# Patient Record
Sex: MALE | Race: WHITE | NOT HISPANIC OR LATINO | Employment: UNEMPLOYED | URBAN - NONMETROPOLITAN AREA
[De-identification: names, ages, dates, MRNs, and addresses within clinical notes are randomized per-mention and may not be internally consistent; named-entity substitution may affect disease eponyms.]

---

## 2017-04-10 ENCOUNTER — HOSPITAL ENCOUNTER (EMERGENCY)
Facility: HOSPITAL | Age: 47
Discharge: HOME | End: 2017-04-10
Attending: EMERGENCY MEDICINE | Admitting: EMERGENCY MEDICINE

## 2017-04-10 ENCOUNTER — APPOINTMENT (OUTPATIENT)
Dept: GENERAL RADIOLOGY | Facility: HOSPITAL | Age: 47
End: 2017-04-10

## 2017-04-10 VITALS
DIASTOLIC BLOOD PRESSURE: 62 MMHG | HEART RATE: 63 BPM | BODY MASS INDEX: 25.2 KG/M2 | HEIGHT: 71 IN | RESPIRATION RATE: 16 BRPM | OXYGEN SATURATION: 100 % | SYSTOLIC BLOOD PRESSURE: 118 MMHG | TEMPERATURE: 98.3 F | WEIGHT: 180 LBS

## 2017-04-10 DIAGNOSIS — M54.42 ACUTE LEFT-SIDED LOW BACK PAIN WITH LEFT-SIDED SCIATICA: Primary | ICD-10-CM

## 2017-04-10 PROCEDURE — 72110 X-RAY EXAM L-2 SPINE 4/>VWS: CPT

## 2017-04-10 PROCEDURE — 99283 EMERGENCY DEPT VISIT LOW MDM: CPT

## 2017-04-10 RX ORDER — KETOROLAC TROMETHAMINE 30 MG/ML
60 INJECTION, SOLUTION INTRAMUSCULAR; INTRAVENOUS ONCE
Status: DISCONTINUED | OUTPATIENT
Start: 2017-04-10 | End: 2017-04-10 | Stop reason: HOSPADM

## 2017-04-10 RX ORDER — METHYLPREDNISOLONE 4 MG/1
TABLET ORAL
Qty: 21 TABLET | Refills: 0 | OUTPATIENT
Start: 2017-04-10 | End: 2020-01-15 | Stop reason: HOSPADM

## 2017-04-10 NOTE — ED PROVIDER NOTES
Subjective   Patient is a 47 y.o. male presenting with back pain.   Back Pain   Location:  Lumbar spine  Quality:  Shooting  Radiates to:  L thigh  Pain severity:  Mild  Pain is:  Same all the time  Onset quality:  Sudden  Timing:  Constant  Progression:  Unchanged  Chronicity:  New  Context: lifting heavy objects    Context: not emotional stress, not falling, not jumping from heights, not MCA, not MVA, not occupational injury, not pedestrian accident, not physical stress, not recent illness, not recent injury and not twisting    Relieved by:  Nothing  Worsened by:  Movement and bending  Ineffective treatments:  None tried  Associated symptoms: no abdominal pain, no abdominal swelling, no bladder incontinence, no bowel incontinence, no chest pain, no dysuria, no fever, no headaches, no leg pain, no numbness, no paresthesias, no pelvic pain, no perianal numbness, no tingling, no weakness and no weight loss    Risk factors: no hx of cancer, no hx of osteoporosis, no lack of exercise, no menopause, not obese, no recent surgery and no vascular disease        Review of Systems   Constitutional: Negative.  Negative for chills, fatigue, fever and weight loss.   HENT: Negative.  Negative for congestion.    Respiratory: Negative.  Negative for cough, chest tightness and stridor.    Cardiovascular: Negative.  Negative for chest pain.   Gastrointestinal: Negative.  Negative for abdominal distention, abdominal pain, bowel incontinence, nausea and vomiting.   Endocrine: Negative.    Genitourinary: Negative.  Negative for bladder incontinence, difficulty urinating, dysuria, flank pain and pelvic pain.   Musculoskeletal: Positive for back pain.   Skin: Negative.  Negative for color change.   Neurological: Negative.  Negative for dizziness, tingling, weakness, numbness, headaches and paresthesias.   All other systems reviewed and are negative.      Past Medical History:   Diagnosis Date   • Gallbladder attack        No Known  Allergies    History reviewed. No pertinent surgical history.    History reviewed. No pertinent family history.    Social History     Social History   • Marital status: Single     Spouse name: N/A   • Number of children: N/A   • Years of education: N/A     Social History Main Topics   • Smoking status: Current Every Day Smoker     Packs/day: 1.00     Types: Cigarettes   • Smokeless tobacco: None   • Alcohol use No   • Drug use: No   • Sexual activity: Defer     Other Topics Concern   • None     Social History Narrative           Objective   Physical Exam   Constitutional: He is oriented to person, place, and time. He appears well-developed and well-nourished.   HENT:   Head: Normocephalic and atraumatic.   Eyes: Conjunctivae and EOM are normal. Pupils are equal, round, and reactive to light.   Neck: Normal range of motion. Neck supple.   Cardiovascular: Normal rate, regular rhythm, normal heart sounds and intact distal pulses.    Pulmonary/Chest: Effort normal and breath sounds normal.   Abdominal: Soft. Bowel sounds are normal.   Musculoskeletal:        Lumbar back: He exhibits decreased range of motion and tenderness. He exhibits no bony tenderness, no swelling, no edema, no deformity, no laceration, no pain, no spasm and normal pulse.   Neurological: He is alert and oriented to person, place, and time. He has normal reflexes. No cranial nerve deficit or sensory deficit. He displays no Babinski's sign on the right side. He displays no Babinski's sign on the left side.   Reflex Scores:       Tricep reflexes are 2+ on the right side and 2+ on the left side.       Bicep reflexes are 2+ on the right side and 2+ on the left side.       Brachioradialis reflexes are 2+ on the right side and 2+ on the left side.       Patellar reflexes are 2+ on the right side and 2+ on the left side.       Achilles reflexes are 2+ on the right side and 2+ on the left side.  Skin: Skin is warm and dry.   Psychiatric: He has a normal mood  and affect.   Nursing note and vitals reviewed.      Procedures         ED Course  ED Course   Comment By Time   Will need an mri as out pt Denny Barrera MD 04/10 0814                  [facility]    Final diagnoses:   Acute left-sided low back pain with left-sided sciatica            Denny Barrera MD  04/10/17 0823

## 2017-04-25 PROCEDURE — 82150 ASSAY OF AMYLASE: CPT | Performed by: NURSE PRACTITIONER

## 2017-04-25 PROCEDURE — 85025 COMPLETE CBC W/AUTO DIFF WBC: CPT | Performed by: NURSE PRACTITIONER

## 2017-04-25 PROCEDURE — 80053 COMPREHEN METABOLIC PANEL: CPT | Performed by: NURSE PRACTITIONER

## 2017-04-25 PROCEDURE — 83690 ASSAY OF LIPASE: CPT | Performed by: NURSE PRACTITIONER

## 2020-01-15 ENCOUNTER — HOSPITAL ENCOUNTER (EMERGENCY)
Facility: HOSPITAL | Age: 50
Discharge: HOME | End: 2020-01-15
Admitting: EMERGENCY MEDICINE

## 2020-01-15 VITALS
WEIGHT: 180 LBS | HEART RATE: 83 BPM | RESPIRATION RATE: 18 BRPM | SYSTOLIC BLOOD PRESSURE: 145 MMHG | HEIGHT: 71 IN | OXYGEN SATURATION: 97 % | DIASTOLIC BLOOD PRESSURE: 80 MMHG | TEMPERATURE: 98.9 F | BODY MASS INDEX: 25.2 KG/M2

## 2020-01-15 DIAGNOSIS — T23.232A PARTIAL THICKNESS BURN OF MULTIPLE FINGERS OF LEFT HAND EXCLUDING THUMB, INITIAL ENCOUNTER: ICD-10-CM

## 2020-01-15 DIAGNOSIS — T23.231A PARTIAL THICKNESS BURN OF MULTIPLE FINGERS OF RIGHT HAND EXCLUDING THUMB, INITIAL ENCOUNTER: Primary | ICD-10-CM

## 2020-01-15 DIAGNOSIS — T23.199A: ICD-10-CM

## 2020-01-15 PROCEDURE — 25010000002 MORPHINE PER 10 MG: Performed by: EMERGENCY MEDICINE

## 2020-01-15 PROCEDURE — 99284 EMERGENCY DEPT VISIT MOD MDM: CPT

## 2020-01-15 PROCEDURE — 96372 THER/PROPH/DIAG INJ SC/IM: CPT

## 2020-01-15 RX ORDER — HYDROCODONE BITARTRATE AND ACETAMINOPHEN 5; 325 MG/1; MG/1
1 TABLET ORAL EVERY 4 HOURS PRN
Qty: 15 TABLET | Refills: 0 | OUTPATIENT
Start: 2020-01-15 | End: 2020-03-30 | Stop reason: HOSPADM

## 2020-01-15 RX ORDER — ONDANSETRON 4 MG/1
4 TABLET, ORALLY DISINTEGRATING ORAL ONCE
Status: COMPLETED | OUTPATIENT
Start: 2020-01-15 | End: 2020-01-15

## 2020-01-15 RX ADMIN — ONDANSETRON 4 MG: 4 TABLET, ORALLY DISINTEGRATING ORAL at 11:44

## 2020-01-15 RX ADMIN — MORPHINE SULFATE 4 MG: 4 INJECTION, SOLUTION INTRAMUSCULAR; INTRAVENOUS at 11:44

## 2020-01-15 NOTE — DISCHARGE INSTRUCTIONS
Burn Care, Adult  A burn is an injury to the skin or the tissues under the skin. There are three types of burns:  · First degree. These burns may cause the skin to be red and a bit swollen.  · Second degree. These burns are very painful and cause the skin to be very red. The skin may also leak fluid, look shiny, and start to have blisters.  · Third degree. These burns cause permanent damage. They turn the skin white or black and make it look charred, dry, and leathery.  Taking care of your burn properly can help to prevent pain and infection. It can also help the burn to heal more quickly.  How is this treated?  Right after a burn:  · Rinse or soak the burn under cool water. Do this for several minutes. Do not put ice on your burn. That can cause more damage.  · Lightly cover the burn with a clean (sterile) cloth (dressing).  Burn care  · Raise (elevate) the injured area above the level of your heart while sitting or lying down.  · Follow instructions from your doctor about:  ? How to clean and take care of the burn.  ? When to change and remove the cloth.  · Check your burn every day for signs of infection. Check for:  ? More redness, swelling, or pain.  ? Warmth.  ? Pus or a bad smell.  Medicine    · Take over-the-counter and prescription medicines only as told by your doctor.  · If you were prescribed antibiotic medicine, take or apply it as told by your doctor. Do not stop using the antibiotic even if your condition improves.  General instructions  · To prevent infection:  ? Do not put butter, oil, or other home treatments on the burn.  ? Do not scratch or pick at the burn.  ? Do not break any blisters.  ? Do not peel skin.  · Do not rub your burn, even when you are cleaning it.  · Protect your burn from the sun.  Contact a doctor if:  · Your condition does not get better.  · Your condition gets worse.  · You have a fever.  · Your burn looks different or starts to have black or red spots on it.  · Your burn  feels warm to the touch.  · Your pain is not controlled with medicine.  Get help right away if:  · You have redness, swelling, or pain at the site of the burn.  · You have fluid, blood, or pus coming from your burn.  · You have red streaks near the burn.  · You have very bad pain.  This information is not intended to replace advice given to you by your health care provider. Make sure you discuss any questions you have with your health care provider.  Document Released: 09/26/2009 Document Revised: 07/31/2018 Document Reviewed: 06/06/2017  [employer] Interactive Patient Education © 2019 [employer]      Second-Degree Burn, Adult  A second-degree burn, also called a partial thickness wound, is a serious injury that affects the first two layers of skin. A second-degree burn may be minor or major, depending on the size of the burn and which parts of the skin are burned.  What are the causes?  This condition may be caused by:  · Heat. Burns caused by heat happen when skin comes in contact with something very hot, such as a flame or hot liquid.  · Radiation. Sources of radiation include sunlight, radiation used to heat food, and radiation treatments.  · Electricity. Burns caused by electricity happen when electricity passes through the body from lightning, wiring, electrical outlets, appliances, or power lines.  · Certain chemicals, such as acids that come in contact with the skin or eyes. Some chemicals can go through clothing.  What increases the risk?  This condition is more likely to occur in people who:  · Are often exposed to high-risk environments, such as those with open flames, chemicals, or electricity.  · Have cancer and are being treated with radiation.  What are the signs or symptoms?  Symptoms of this condition include:  · Severe pain.  · Skin that is deep red, blistered, tender, and swollen.  · Skin that has changed color.  · Skin that looks blotchy, wet, or shiny.  How is this diagnosed?  This condition is  usually diagnosed with an exam of the wounded area. To get a better look at the wound, your health care provider may remove any blistered skin.  It may take several days to find out if you have a second-degree burn because the signs of this kind of burn can take time to develop. You may need to watch the wound for changes at home and visit a health care provider repeatedly to have your wound checked for changes. If the wound is large, you may need to stay in the hospital so a health care team can examine the wound for a few days.  How is this treated?  Treatment depends on the severity and cause of the burn. Healing may take several weeks. Some second-degree burns, including major burns, electrical burns, and chemical burns, may need to be treated in a hospital. Treatment may involve:  · Cooling the burn with cool, germ-free (sterile) water.  · Taking or applying medicines, such as:  ? Medicines to relieve pain or itching.  ? Ointments to treat or prevent infection.  ? Antibiotic medicine to treat or prevent infection.  · Getting a tetanus shot.  · Covering the burn with a bandage (dressing). If your fingers or toes were burned, each of them may be bandaged separately.  · Compression dressings to prevent scarring and help the burned body part stay moveable.  · Removing dead skin. This is done by a health care provider. Do not try to remove dead skin yourself.  Deep burns can cause skin tissue to die, and a scab (eschar) may form where the skin used to be. If you have a deep burn and an eschar forms, you may need surgery to remove the eschar so the skin can heal properly. If your wound is deep and large (it covers more than 15% of your skin), treatment may also involve:  · Receiving fluids and nutrition.  · Close monitoring of blood flow near the wound.  · Oxygen given through a mask or a machine (ventilator). This may be needed if a burn causes fluid shifts in the body that make it hard to breathe.  Follow these  instructions at home:  Wound care    · Follow instructions from your health care provider about how to take care of your wound. Make sure you:  ? Wash your hands with soap and water before you change your dressing. If soap and water are not available, use hand .  ? Change your dressing as directed.  ? If you have a compression dressing, wear it as directed.  · Clean your wound 2-3 times a day or as often as directed.  ? Wash the wound with mild soap and water.  ? Rinse the wound with water to remove all soap.  ? Pat the wound dry with a clean towel. Do not rub it.  · Check your wound every day for signs of infection. Check for:  ? More redness, swelling, or pain.  ? More fluid or blood.  ? Warmth.  ? Pus or a bad smell.  ? Yellow or green fluid.  · Do not scratch or pick at the wound  · Do not break any blisters or peel any skin.  · Avoid exposing your wound to the sun.  Medicine  · Take and apply over-the-counter and prescription medicines only as told by your health care provider.  · Take or apply your antibiotic medicine as told by your health care provider. Do not stop using the antibiotic even if you start to feel better.  Eating and drinking  · Drink enough fluid to keep your urine clear or pale yellow.  · Eat a nutritious diet that is high in protein. This will help your wound to heal.  General instructions  · Raise (elevate) the injured area above the level of your heart while sitting or lying down.  · Rest as directed. Do not exercise until your health care provider approves.  · Do not take baths, swim, or use a hot tub until your health care provider approves.  · Do not put ice on your burn. This can cause more damage. Try cooling the burn with:  ? Cool water.  ? A cool, wet, clean cloth (cool compress).  · Keep all follow-up visits as directed. This is important.  Contact a health care provider if:  · Your symptoms do not improve with treatment.  · Your pain is not relieved with medicine.  · You  have more redness, swelling, or pain around your wound.  · You have more fluid or blood coming from your wound.  · You have yellow or green fluid, pus, or a bad smell coming from your wound.  · Your wound feels warm to the touch.  · You have a fever.  Get help right away if:  · You develop red streaks near the wound.  · You develop severe pain.  Summary  · A second-degree burn is a serious injury that affects the first two layers of skin.  · Clean your wound 2-3 times a day or as often as directed. Check your wound every day for signs of infection.  · Do not scratch or pick at your wound, break blisters, peel skin, or put ice on your burn.  This information is not intended to replace advice given to you by your health care provider. Make sure you discuss any questions you have with your health care provider.  Document Released: 05/21/2012 Document Revised: 11/28/2017 Document Reviewed: 11/28/2017  [employer] Interactive Patient Education © 2019 [employer]
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
## 2020-03-30 ENCOUNTER — APPOINTMENT (OUTPATIENT)
Dept: GENERAL RADIOLOGY | Facility: HOSPITAL | Age: 50
End: 2020-03-30

## 2020-03-30 ENCOUNTER — HOSPITAL ENCOUNTER (EMERGENCY)
Facility: HOSPITAL | Age: 50
Discharge: HOME | End: 2020-03-30
Admitting: EMERGENCY MEDICINE

## 2020-03-30 VITALS
OXYGEN SATURATION: 100 % | WEIGHT: 158 LBS | DIASTOLIC BLOOD PRESSURE: 90 MMHG | RESPIRATION RATE: 16 BRPM | BODY MASS INDEX: 22.12 KG/M2 | HEIGHT: 71 IN | SYSTOLIC BLOOD PRESSURE: 136 MMHG | TEMPERATURE: 98 F | HEART RATE: 61 BPM

## 2020-03-30 DIAGNOSIS — S86.911A KNEE STRAIN, RIGHT, INITIAL ENCOUNTER: Primary | ICD-10-CM

## 2020-03-30 DIAGNOSIS — M17.11 ARTHRITIS OF RIGHT KNEE: ICD-10-CM

## 2020-03-30 PROCEDURE — 73562 X-RAY EXAM OF KNEE 3: CPT

## 2020-03-30 PROCEDURE — 99283 EMERGENCY DEPT VISIT LOW MDM: CPT

## 2020-03-30 RX ORDER — RANITIDINE HCL 75 MG
75 TABLET ORAL 2 TIMES DAILY PRN
COMMUNITY
End: 2020-06-22

## 2020-03-30 RX ORDER — OXYCODONE HYDROCHLORIDE AND ACETAMINOPHEN 5; 325 MG/1; MG/1
1 TABLET ORAL EVERY 4 HOURS PRN
Qty: 12 TABLET | Refills: 0 | Status: SHIPPED | OUTPATIENT
Start: 2020-03-30 | End: 2020-06-22

## 2020-03-30 RX ORDER — METHYLPREDNISOLONE 4 MG/1
TABLET ORAL
Qty: 21 TABLET | Refills: 0 | Status: SHIPPED | OUTPATIENT
Start: 2020-03-30 | End: 2020-06-22

## 2020-06-22 ENCOUNTER — APPOINTMENT (OUTPATIENT)
Dept: PREADMISSION TESTING | Facility: HOSPITAL | Age: 50
End: 2020-06-22

## 2020-06-22 VITALS
SYSTOLIC BLOOD PRESSURE: 125 MMHG | BODY MASS INDEX: 24.93 KG/M2 | WEIGHT: 174.16 LBS | HEIGHT: 70 IN | DIASTOLIC BLOOD PRESSURE: 64 MMHG | OXYGEN SATURATION: 97 % | HEART RATE: 57 BPM | RESPIRATION RATE: 18 BRPM

## 2020-06-22 LAB
DEPRECATED RDW RBC AUTO: 45.4 FL (ref 37–54)
ERYTHROCYTE [DISTWIDTH] IN BLOOD BY AUTOMATED COUNT: 14 % (ref 12.3–15.4)
HCT VFR BLD AUTO: 42.4 % (ref 37.5–51)
HGB BLD-MCNC: 13.5 G/DL (ref 13–17.7)
MCH RBC QN AUTO: 28.4 PG (ref 26.6–33)
MCHC RBC AUTO-ENTMCNC: 31.8 G/DL (ref 31.5–35.7)
MCV RBC AUTO: 89.1 FL (ref 79–97)
PLATELET # BLD AUTO: 301 10*3/MM3 (ref 140–450)
PMV BLD AUTO: 9.7 FL (ref 6–12)
RBC # BLD AUTO: 4.76 10*6/MM3 (ref 4.14–5.8)
WBC NRBC COR # BLD: 10.14 10*3/MM3 (ref 3.4–10.8)

## 2020-06-22 PROCEDURE — 36415 COLL VENOUS BLD VENIPUNCTURE: CPT

## 2020-06-22 PROCEDURE — 85027 COMPLETE CBC AUTOMATED: CPT | Performed by: ORTHOPAEDIC SURGERY

## 2020-06-22 NOTE — DISCHARGE INSTRUCTIONS
DAY OF SURGERY INSTRUCTIONS        YOUR SURGEON: RAGHU CANNON    PROCEDURE: RIGHT KNEE ARTHROSCOPY    DATE OF SURGERY: July 1, 2020    ARRIVAL TIME: AS DIRECTED BY OFFICE    YOU MAY TAKE THE FOLLOWING MEDICATION(S) THE MORNING OF SURGERY WITH A SIP OF WATER: NONE      ALL OTHER HOME MEDICATION CHECK WITH YOUR PHYSICIAN      DO NOT TAKE ANY ERECTILE DYSFUNCTION MEDICATIONS (EX: CIALIS, VIAGRA) 24 HOURS PRIOR TO SURGERY                      MANAGING PAIN AFTER SURGERY    We know you are probably wondering what your pain will be like after surgery.  Following surgery it is unrealistic to expect you will not have pain.   Pain is how our bodies let us know that something is wrong or cautions us to be careful.  That said, our goal is to make your pain tolerable.    Methods we may use to treat your pain include (oral or IV medications, PCAs, epidurals, nerve blocks, etc.)   While some procedures require IV pain medications for a short time after surgery, transitioning to pain medications by mouth allows for better management of pain.   Your nurse will encourage you to take oral pain medications whenever possible.  IV medications work almost immediately, but only last a short while.  Taking medications by mouth allows for a more constant level of medication in your blood stream for a longer period of time.      Once your pain is out of control it is harder to get back under control.  It is important you are aware when your next dose of pain medication is due.  If you are admitted, your nurse may write the time of your next dose on the white board in your room to help you remember.      We are interested in your pain and encourage you to inform us about aggravating factors during your visit.   Many times a simple repositioning every few hours can make a big difference.    If your physician says it is okay, do not let your pain prevent you from getting out of bed. Be sure to call your nurse for assistance prior to  getting up so you do not fall.      Before surgery, please decide your tolerable pain goal.  These faces help describe the pain ratings we use on a 0-10 scale.   Be prepared to tell us your goal and whether or not you take pain or anxiety medications at home.          BEFORE YOU COME TO THE HOSPITAL  (Pre-op instructions)  • Do not eat, drink, smoke or chew gum after midnight the night before surgery.  This also includes no mints.  • Morning of surgery take only the medicines you have been instructed with a sip of water unless otherwise instructed  by your physician.  • Do not shave, wear makeup or dark nail polish.  • Remove all jewelry including rings.  • Leave anything you consider valuable at home.  • Leave your suitcase in the car until after your surgery.  • Bring the following with you if applicable:  o Picture ID and insurance, Medicare or Medicaid cards  o Co-pay/deductible required by insurance (cash, check, credit card)  o Copy of advance directive, living will or power-of- documents if not brought to PAT  o CPAP or BIPAP mask and tubing  o Relaxation aids ( book, magazine), etc.  o Hearing aids                        ON THE DAY OF SURGERY  · On the day of surgery check in at registration located at the main entrance of the hospital.   ? You will be registered and given a beeper with instructions where to wait in the main lobby.  ? When your beeper lights up and vibrates a member of the Outpatient Surgery staff will meet you at the double doors under the stair steps and escort you to your preoperative room.   · You may have cloth compression devices placed on your legs. These help to prevent blood clots and reduce swelling in your legs.  · An IV may be inserted into one of your veins.  · In the operating room, you may be given one or more of the following:  ? A medicine to help you relax (sedative).  ? A medicine to numb the area (local anesthetic).  ? A medicine to make you fall asleep (general  "anesthetic).  ? A medicine that is injected into an area of your body to numb everything below the injection site (regional anesthetic).  · Your surgical site will be marked or identified.  · You may be given an antibiotic through your IV to help prevent infection.  Contact a health care provider if you:  · Develop a fever of more than 100.4°F (38°C) or other feelings of illness during the 48 hours before your surgery.  · Have symptoms that get worse.  Have questions or concerns about your surgery    General Anesthesia/Surgery, Adult  General anesthesia is the use of medicines to make a person \"go to sleep\" (unconscious) for a medical procedure. General anesthesia must be used for certain procedures, and is often recommended for procedures that:  · Last a long time.  · Require you to be still or in an unusual position.  · Are major and can cause blood loss.  The medicines used for general anesthesia are called general anesthetics. As well as making you unconscious for a certain amount of time, these medicines:  · Prevent pain.  · Control your blood pressure.  · Relax your muscles.  Tell a health care provider about:  · Any allergies you have.  · All medicines you are taking, including vitamins, herbs, eye drops, creams, and over-the-counter medicines.  · Any problems you or family members have had with anesthetic medicines.  · Types of anesthetics you have had in the past.  · Any blood disorders you have.  · Any surgeries you have had.  · Any medical conditions you have.  · Any recent upper respiratory, chest, or ear infections.  · Any history of:  ? Heart or lung conditions, such as heart failure, sleep apnea, asthma, or chronic obstructive pulmonary disease (COPD).  ?  service.  ? Depression or anxiety.  · Any tobacco or drug use, including marijuana or alcohol use.  · Whether you are pregnant or may be pregnant.  What are the risks?  Generally, this is a safe procedure. However, problems may occur, " including:  · Allergic reaction.  · Lung and heart problems.  · Inhaling food or liquid from the stomach into the lungs (aspiration).  · Nerve injury.  · Air in the bloodstream, which can lead to stroke.  · Extreme agitation or confusion (delirium) when you wake up from the anesthetic.  · Waking up during your procedure and being unable to move. This is rare.  These problems are more likely to develop if you are having a major surgery or if you have an advanced or serious medical condition. You can prevent some of these complications by answering all of your health care provider's questions thoroughly and by following all instructions before your procedure.  General anesthesia can cause side effects, including:  · Nausea or vomiting.  · A sore throat from the breathing tube.  · Hoarseness.  · Wheezing or coughing.  · Shaking chills.  · Tiredness.  · Body aches.  · Anxiety.  · Sleepiness or drowsiness.  · Confusion or agitation.  RISKS AND COMPLICATIONS OF SURGERY  Your health care provider will discuss possible risks and complications with you before surgery. Common risks and complications include:    · Problems due to the use of anesthetics.  · Blood loss and replacement (does not apply to minor surgical procedures).  · Temporary increase in pain due to surgery.  · Uncorrected pain or problems that the surgery was meant to correct.  · Infection.  · New damage.    What happens before the procedure?    Medicines  Ask your health care provider about:  · Changing or stopping your regular medicines. This is especially important if you are taking diabetes medicines or blood thinners.  · Taking medicines such as aspirin and ibuprofen. These medicines can thin your blood. Do not take these medicines unless your health care provider tells you to take them.  · Taking over-the-counter medicines, vitamins, herbs, and supplements. Do not take these during the week before your procedure unless your health care provider approves  them.  General instructions  · Starting 3-6 weeks before the procedure, do not use any products that contain nicotine or tobacco, such as cigarettes and e-cigarettes. If you need help quitting, ask your health care provider.  · If you brush your teeth on the morning of the procedure, make sure to spit out all of the toothpaste.  · Tell your health care provider if you become ill or develop a cold, cough, or fever.  · If instructed by your health care provider, bring your sleep apnea device with you on the day of your surgery (if applicable).  · Ask your health care provider if you will be going home the same day, the following day, or after a longer hospital stay.  ? Plan to have someone take you home from the hospital or clinic.  ? Plan to have a responsible adult care for you for at least 24 hours after you leave the hospital or clinic. This is important.  What happens during the procedure?  · You will be given anesthetics through both of the following:  ? A mask placed over your nose and mouth.  ? An IV in one of your veins.  · You may receive a medicine to help you relax (sedative).  · After you are unconscious, a breathing tube may be inserted down your throat to help you breathe. This will be removed before you wake up.  · An anesthesia specialist will stay with you throughout your procedure. He or she will:  ? Keep you comfortable and safe by continuing to give you medicines and adjusting the amount of medicine that you get.  ? Monitor your blood pressure, pulse, and oxygen levels to make sure that the anesthetics do not cause any problems.  The procedure may vary among health care providers and hospitals.  What happens after the procedure?  · Your blood pressure, temperature, heart rate, breathing rate, and blood oxygen level will be monitored until the medicines you were given have worn off.  · You will wake up in a recovery area. You may wake up slowly.  · If you feel anxious or agitated, you may be given  medicine to help you calm down.  · If you will be going home the same day, your health care provider may check to make sure you can walk, drink, and urinate.  · Your health care provider will treat any pain or side effects you have before you go home.  · Do not drive for 24 hours if you were given a sedative.  Summary  · General anesthesia is used to keep you still and prevent pain during a procedure.  · It is important to tell your healthcare provider about your medical history and any surgeries you have had, and previous experience with anesthesia.  · Follow your healthcare provider’s instructions about when to stop eating, drinking, or taking certain medicines before your procedure.  · Plan to have someone take you home from the hospital or clinic.  This information is not intended to replace advice given to you by your health care provider. Make sure you discuss any questions you have with your health care provider.  Document Released: 03/26/2009 Document Revised: 08/03/2018 Document Reviewed: 08/03/2018  [employer] Interactive Patient Education © 2019 [employer]       Fall Prevention in Hospitals, Adult  As a hospital patient, your condition and the treatments you receive can increase your risk for falls. Some additional risk factors for falls in a hospital include:  · Being in an unfamiliar environment.  · Being on bed rest.  · Your surgery.  · Taking certain medicines.  · Your tubing requirements, such as intravenous (IV) therapy or catheters.  It is important that you learn how to decrease fall risks while at the hospital. Below are important tips that can help prevent falls.  SAFETY TIPS FOR PREVENTING FALLS  Talk about your risk of falling.  · Ask your health care provider why you are at risk for falling. Is it your medicine, illness, tubing placement, or something else?  · Make a plan with your health care provider to keep you safe from falls.  · Ask your health care provider or pharmacist about side  effects of your medicines. Some medicines can make you dizzy or affect your coordination.  Ask for help.  · Ask for help before getting out of bed. You may need to press your call button.  · Ask for assistance in getting safely to the toilet.  · Ask for a walker or cane to be put at your bedside. Ask that most of the side rails on your bed be placed up before your health care provider leaves the room.  · Ask family or friends to sit with you.  · Ask for things that are out of your reach, such as your glasses, hearing aids, telephone, bedside table, or call button.  Follow these tips to avoid falling:  · Stay lying or seated, rather than standing, while waiting for help.  · Wear rubber-soled slippers or shoes whenever you walk in the hospital.  · Avoid quick, sudden movements.  ¨ Change positions slowly.  ¨ Sit on the side of your bed before standing.  ¨ Stand up slowly and wait before you start to walk.  · Let your health care provider know if there is a spill on the floor.  · Pay careful attention to the medical equipment, electrical cords, and tubes around you.  · When you need help, use your call button by your bed or in the bathroom. Wait for one of your health care providers to help you.  · If you feel dizzy or unsure of your footing, return to bed and wait for assistance.  · Avoid being distracted by the TV, telephone, or another person in your room.  · Do not lean or support yourself on rolling objects, such as IV poles or bedside tables.     This information is not intended to replace advice given to you by your health care provider. Make sure you discuss any questions you have with your health care provider.     Document Released: 12/15/2001 Document Revised: 01/08/2016 Document Reviewed: 08/25/2013  [employer] Interactive Patient Education ©2016 [employer]       [facility]  CHG 4% Patient Instruction Sheet    Chlorhexidine Before Surgery  Chlorhexidine gluconate (CHG) is a germ-killing  (antiseptic) solution that is used to clean the skin. It gets rid of the bacteria that normally live on the skin. Cleaning your skin with CHG before surgery helps lower the risk for infection after surgery.    How to use CHG solution  · You will take 2 showers, one shower the night before surgery, the second shower the morning of surgery before coming to the hospital.  · Use CHG only as told by your health care provider, and follow the instructions on the label.  · Use CHG solution while taking a shower. Follow these steps when using CHG solution (unless your health care provider gives you different instructions):  1. Start the shower.  2. Use your normal soap and shampoo to wash your face and hair.  3. Turn off the shower or move out of the shower stream.  4. Pour the CHG onto a clean washcloth. Do not use any type of brush or rough-edged sponge.  5. Starting at your neck, lather your body down to your toes. Make sure you:  6. Pay special attention to the part of your body where you will be having surgery. Scrub this area for at least 1 minute.  7. Use the full amount of CHG as directed. Usually, this is one half bottle for each shower.  8. Do not use CHG on your head or face. If the solution gets into your ears or eyes, rinse them well with water.  9. Avoid your genital area.  10. Avoid any areas of skin that have broken skin, cuts, or scrapes.  11. Scrub your back and under your arms. Make sure to wash skin folds.  12. Let the lather sit on your skin for 1-2 minutes or as long as told by your health care  provider.  13. Thoroughly rinse your entire body in the shower. Make sure that all body creases and crevices are rinsed well.  14. Dry off with a clean towel. Do not put any substances on your body afterward, such as powder, lotion, or perfume.  15. Put on clean clothes or pajamas.  16. If it is the night before your surgery, sleep in clean sheets.    What are the risks?  Risks of using CHG include:  · A skin  reaction.  · Hearing loss, if CHG gets in your ears.  · Eye injury, if CHG gets in your eyes and is not rinsed out.  · The CHG product catching fire.  Make sure that you avoid smoking and flames after applying CHG to your skin.  Do not use CHG:  · If you have a chlorhexidine allergy or have previously reacted to chlorhexidine.  · On babies younger than 2 months of age.      On the day of surgery, when you are taken to your room in Outpatient Surgery you will be given a CHG prepackaged cloth to wipe the site for your surgery.  How to use CHG prepackaged cloths  · Follow the instructions on the label.  · Use the CHG cloth on clean, dry skin. Follow these steps when using a CHG cloth (unless your health care provider gives you different instructions):  1. Using the CHG cloth, vigorously scrub the part of your body where you will be having surgery. Scrub using a back-and-forth motion for 3 minutes. The area on your body should be completely wet with CHG when you are finished scrubbing.  2. Do not rinse. Discard the cloth and let the area air-dry for 1 minute. Do not put any substances on your body afterward, such as powder, lotion, or perfume.  Contact a health care provider if:  · Your skin gets irritated after scrubbing.  · You have questions about using your solution or cloth.  Get help right away if:  · Your eyes become very red or swollen.  · Your eyes itch badly.  · Your skin itches badly and is red or swollen.  · Your hearing changes.  · You have trouble seeing.  · You have swelling or tingling in your mouth or throat.  · You have trouble breathing.  · You swallow any chlorhexidine.  Summary  · Chlorhexidine gluconate (CHG) is a germ-killing (antiseptic) solution that is used to clean the skin. Cleaning your skin with CHG before surgery helps lower the risk for infection after surgery.  · You may be given CHG to use at home. It may be in a bottle or in a prepackaged cloth to use on your skin. Carefully follow  your health care provider's instructions and the instructions on the product label.  · Do not use CHG if you have a chlorhexidine allergy.  · Contact your health care provider if your skin gets irritated after scrubbing.  This information is not intended to replace advice given to you by your health care provider. Make sure you discuss any questions you have with your health care provider.  Document Released: 09/11/2013 Document Revised: 11/15/2018 Document Reviewed: 11/15/2018  [employer] Interactive Patient Education © 2019 [employer]          PATIENT/FAMILY/RESPONSIBLE PARTY VERBALIZES UNDERSTANDING OF ABOVE EDUCATION.  COPY OF PAIN SCALE GIVEN AND REVIEWED WITH VERBALIZED UNDERSTANDING.

## 2020-06-23 ENCOUNTER — TRANSCRIBE ORDERS (OUTPATIENT)
Dept: ADMINISTRATIVE | Facility: HOSPITAL | Age: 50
End: 2020-06-23

## 2020-06-23 DIAGNOSIS — Z01.818 PRE-OP TESTING: Primary | ICD-10-CM

## 2020-06-29 ENCOUNTER — LAB (OUTPATIENT)
Dept: LAB | Facility: HOSPITAL | Age: 50
End: 2020-06-29

## 2020-06-29 LAB — SARS-COV-2 N GENE RESP QL NAA+PROBE: NOT DETECTED

## 2020-06-29 PROCEDURE — C9803 HOPD COVID-19 SPEC COLLECT: HCPCS | Performed by: ORTHOPAEDIC SURGERY

## 2020-06-29 PROCEDURE — 87635 SARS-COV-2 COVID-19 AMP PRB: CPT | Performed by: ORTHOPAEDIC SURGERY

## 2020-07-01 ENCOUNTER — ANESTHESIA (OUTPATIENT)
Dept: PERIOP | Facility: HOSPITAL | Age: 50
End: 2020-07-01

## 2020-07-01 ENCOUNTER — ANESTHESIA EVENT (OUTPATIENT)
Dept: PERIOP | Facility: HOSPITAL | Age: 50
End: 2020-07-01

## 2020-07-01 ENCOUNTER — HOSPITAL ENCOUNTER (OUTPATIENT)
Facility: HOSPITAL | Age: 50
Setting detail: HOSPITAL OUTPATIENT SURGERY
Discharge: HOME | End: 2020-07-01
Attending: ORTHOPAEDIC SURGERY | Admitting: ORTHOPAEDIC SURGERY

## 2020-07-01 VITALS
BODY MASS INDEX: 24.93 KG/M2 | TEMPERATURE: 98.1 F | HEIGHT: 70 IN | OXYGEN SATURATION: 96 % | SYSTOLIC BLOOD PRESSURE: 117 MMHG | DIASTOLIC BLOOD PRESSURE: 66 MMHG | RESPIRATION RATE: 14 BRPM | WEIGHT: 174.16 LBS | HEART RATE: 61 BPM

## 2020-07-01 PROCEDURE — 25010000002 DEXAMETHASONE PER 1 MG: Performed by: ANESTHESIOLOGY

## 2020-07-01 PROCEDURE — 25010000002 PROPOFOL 10 MG/ML EMULSION: Performed by: NURSE ANESTHETIST, CERTIFIED REGISTERED

## 2020-07-01 PROCEDURE — 25010000002 FENTANYL CITRATE (PF) 100 MCG/2ML SOLUTION: Performed by: ANESTHESIOLOGY

## 2020-07-01 PROCEDURE — 25010000002 MIDAZOLAM PER 1 MG: Performed by: ANESTHESIOLOGY

## 2020-07-01 PROCEDURE — 25010000002 FENTANYL CITRATE (PF) 100 MCG/2ML SOLUTION: Performed by: NURSE ANESTHETIST, CERTIFIED REGISTERED

## 2020-07-01 PROCEDURE — 25010000002 METHYLPREDNISOLONE PER 80 MG: Performed by: ORTHOPAEDIC SURGERY

## 2020-07-01 RX ORDER — OXYCODONE AND ACETAMINOPHEN 10; 325 MG/1; MG/1
1 TABLET ORAL ONCE AS NEEDED
Status: DISCONTINUED | OUTPATIENT
Start: 2020-07-01 | End: 2020-07-01 | Stop reason: HOSPADM

## 2020-07-01 RX ORDER — DEXAMETHASONE SODIUM PHOSPHATE 4 MG/ML
4 INJECTION, SOLUTION INTRA-ARTICULAR; INTRALESIONAL; INTRAMUSCULAR; INTRAVENOUS; SOFT TISSUE ONCE AS NEEDED
Status: COMPLETED | OUTPATIENT
Start: 2020-07-01 | End: 2020-07-01

## 2020-07-01 RX ORDER — LIDOCAINE HYDROCHLORIDE 10 MG/ML
0.5 INJECTION, SOLUTION EPIDURAL; INFILTRATION; INTRACAUDAL; PERINEURAL ONCE AS NEEDED
Status: DISCONTINUED | OUTPATIENT
Start: 2020-07-01 | End: 2020-07-01 | Stop reason: HOSPADM

## 2020-07-01 RX ORDER — FLUMAZENIL 0.1 MG/ML
0.2 INJECTION INTRAVENOUS AS NEEDED
Status: DISCONTINUED | OUTPATIENT
Start: 2020-07-01 | End: 2020-07-01 | Stop reason: HOSPADM

## 2020-07-01 RX ORDER — OXYCODONE AND ACETAMINOPHEN 7.5; 325 MG/1; MG/1
2 TABLET ORAL EVERY 4 HOURS PRN
Status: DISCONTINUED | OUTPATIENT
Start: 2020-07-01 | End: 2020-07-01 | Stop reason: HOSPADM

## 2020-07-01 RX ORDER — ACETAMINOPHEN 500 MG
1000 TABLET ORAL ONCE
Status: COMPLETED | OUTPATIENT
Start: 2020-07-01 | End: 2020-07-01

## 2020-07-01 RX ORDER — MIDAZOLAM HYDROCHLORIDE 1 MG/ML
2 INJECTION INTRAMUSCULAR; INTRAVENOUS
Status: DISCONTINUED | OUTPATIENT
Start: 2020-07-01 | End: 2020-07-01 | Stop reason: HOSPADM

## 2020-07-01 RX ORDER — SODIUM CHLORIDE 0.9 % (FLUSH) 0.9 %
3-10 SYRINGE (ML) INJECTION AS NEEDED
Status: DISCONTINUED | OUTPATIENT
Start: 2020-07-01 | End: 2020-07-01 | Stop reason: HOSPADM

## 2020-07-01 RX ORDER — MAGNESIUM HYDROXIDE 1200 MG/15ML
LIQUID ORAL AS NEEDED
Status: DISCONTINUED | OUTPATIENT
Start: 2020-07-01 | End: 2020-07-01 | Stop reason: HOSPADM

## 2020-07-01 RX ORDER — SODIUM CHLORIDE, SODIUM LACTATE, POTASSIUM CHLORIDE, CALCIUM CHLORIDE 600; 310; 30; 20 MG/100ML; MG/100ML; MG/100ML; MG/100ML
100 INJECTION, SOLUTION INTRAVENOUS CONTINUOUS PRN
Status: DISCONTINUED | OUTPATIENT
Start: 2020-07-01 | End: 2020-07-01 | Stop reason: HOSPADM

## 2020-07-01 RX ORDER — BUPIVACAINE HYDROCHLORIDE 5 MG/ML
INJECTION, SOLUTION PERINEURAL AS NEEDED
Status: DISCONTINUED | OUTPATIENT
Start: 2020-07-01 | End: 2020-07-01 | Stop reason: HOSPADM

## 2020-07-01 RX ORDER — HYDROCODONE BITARTRATE AND ACETAMINOPHEN 7.5; 325 MG/1; MG/1
1 TABLET ORAL EVERY 6 HOURS PRN
Qty: 40 TABLET | Refills: 0 | Status: SHIPPED | OUTPATIENT
Start: 2020-07-01 | End: 2020-07-11

## 2020-07-01 RX ORDER — ONDANSETRON 2 MG/ML
4 INJECTION INTRAMUSCULAR; INTRAVENOUS ONCE AS NEEDED
Status: DISCONTINUED | OUTPATIENT
Start: 2020-07-01 | End: 2020-07-01 | Stop reason: HOSPADM

## 2020-07-01 RX ORDER — METHYLPREDNISOLONE ACETATE 80 MG/ML
INJECTION, SUSPENSION INTRA-ARTICULAR; INTRALESIONAL; INTRAMUSCULAR; SOFT TISSUE AS NEEDED
Status: DISCONTINUED | OUTPATIENT
Start: 2020-07-01 | End: 2020-07-01 | Stop reason: HOSPADM

## 2020-07-01 RX ORDER — SODIUM CHLORIDE 0.9 % (FLUSH) 0.9 %
10 SYRINGE (ML) INJECTION EVERY 12 HOURS SCHEDULED
Status: DISCONTINUED | OUTPATIENT
Start: 2020-07-01 | End: 2020-07-01 | Stop reason: HOSPADM

## 2020-07-01 RX ORDER — IBUPROFEN 600 MG/1
600 TABLET ORAL ONCE AS NEEDED
Status: DISCONTINUED | OUTPATIENT
Start: 2020-07-01 | End: 2020-07-01 | Stop reason: HOSPADM

## 2020-07-01 RX ORDER — SODIUM CHLORIDE 0.9 % (FLUSH) 0.9 %
3 SYRINGE (ML) INJECTION EVERY 12 HOURS SCHEDULED
Status: DISCONTINUED | OUTPATIENT
Start: 2020-07-01 | End: 2020-07-01 | Stop reason: HOSPADM

## 2020-07-01 RX ORDER — NALOXONE HCL 0.4 MG/ML
0.4 VIAL (ML) INJECTION AS NEEDED
Status: DISCONTINUED | OUTPATIENT
Start: 2020-07-01 | End: 2020-07-01 | Stop reason: HOSPADM

## 2020-07-01 RX ORDER — SODIUM CHLORIDE, SODIUM LACTATE, POTASSIUM CHLORIDE, CALCIUM CHLORIDE 600; 310; 30; 20 MG/100ML; MG/100ML; MG/100ML; MG/100ML
100 INJECTION, SOLUTION INTRAVENOUS CONTINUOUS
Status: DISCONTINUED | OUTPATIENT
Start: 2020-07-01 | End: 2020-07-01 | Stop reason: HOSPADM

## 2020-07-01 RX ORDER — LABETALOL HYDROCHLORIDE 5 MG/ML
5 INJECTION, SOLUTION INTRAVENOUS
Status: DISCONTINUED | OUTPATIENT
Start: 2020-07-01 | End: 2020-07-01 | Stop reason: HOSPADM

## 2020-07-01 RX ORDER — FENTANYL CITRATE 50 UG/ML
25 INJECTION, SOLUTION INTRAMUSCULAR; INTRAVENOUS
Status: COMPLETED | OUTPATIENT
Start: 2020-07-01 | End: 2020-07-01

## 2020-07-01 RX ORDER — SODIUM CHLORIDE 0.9 % (FLUSH) 0.9 %
10 SYRINGE (ML) INJECTION AS NEEDED
Status: DISCONTINUED | OUTPATIENT
Start: 2020-07-01 | End: 2020-07-01 | Stop reason: HOSPADM

## 2020-07-01 RX ORDER — PROPOFOL 10 MG/ML
VIAL (ML) INTRAVENOUS AS NEEDED
Status: DISCONTINUED | OUTPATIENT
Start: 2020-07-01 | End: 2020-07-01 | Stop reason: SURG

## 2020-07-01 RX ORDER — FENTANYL CITRATE 50 UG/ML
INJECTION, SOLUTION INTRAMUSCULAR; INTRAVENOUS AS NEEDED
Status: DISCONTINUED | OUTPATIENT
Start: 2020-07-01 | End: 2020-07-01 | Stop reason: SURG

## 2020-07-01 RX ADMIN — FENTANYL CITRATE 50 MCG: 50 INJECTION, SOLUTION INTRAMUSCULAR; INTRAVENOUS at 07:58

## 2020-07-01 RX ADMIN — FENTANYL CITRATE 25 MCG: 50 INJECTION, SOLUTION INTRAMUSCULAR; INTRAVENOUS at 09:14

## 2020-07-01 RX ADMIN — FENTANYL CITRATE 50 MCG: 50 INJECTION, SOLUTION INTRAMUSCULAR; INTRAVENOUS at 08:06

## 2020-07-01 RX ADMIN — FENTANYL CITRATE 25 MCG: 50 INJECTION, SOLUTION INTRAMUSCULAR; INTRAVENOUS at 08:59

## 2020-07-01 RX ADMIN — FENTANYL CITRATE 50 MCG: 50 INJECTION, SOLUTION INTRAMUSCULAR; INTRAVENOUS at 08:14

## 2020-07-01 RX ADMIN — ACETAMINOPHEN 1000 MG: 500 TABLET, FILM COATED ORAL at 06:26

## 2020-07-01 RX ADMIN — SODIUM CHLORIDE, POTASSIUM CHLORIDE, SODIUM LACTATE AND CALCIUM CHLORIDE: 600; 310; 30; 20 INJECTION, SOLUTION INTRAVENOUS at 08:19

## 2020-07-01 RX ADMIN — PROPOFOL 200 MG: 10 INJECTION, EMULSION INTRAVENOUS at 07:49

## 2020-07-01 RX ADMIN — FENTANYL CITRATE 25 MCG: 50 INJECTION, SOLUTION INTRAMUSCULAR; INTRAVENOUS at 09:09

## 2020-07-01 RX ADMIN — SODIUM CHLORIDE, POTASSIUM CHLORIDE, SODIUM LACTATE AND CALCIUM CHLORIDE 100 ML/HR: 600; 310; 30; 20 INJECTION, SOLUTION INTRAVENOUS at 06:03

## 2020-07-01 RX ADMIN — DEXAMETHASONE SODIUM PHOSPHATE 4 MG: 4 INJECTION, SOLUTION INTRAMUSCULAR; INTRAVENOUS at 06:26

## 2020-07-01 RX ADMIN — FENTANYL CITRATE 50 MCG: 50 INJECTION, SOLUTION INTRAMUSCULAR; INTRAVENOUS at 08:35

## 2020-07-01 RX ADMIN — MIDAZOLAM HYDROCHLORIDE 2 MG: 1 INJECTION, SOLUTION INTRAMUSCULAR; INTRAVENOUS at 06:26

## 2020-07-01 RX ADMIN — LIDOCAINE HYDROCHLORIDE 100 MG: 20 INJECTION, SOLUTION INTRAVENOUS at 07:49

## 2020-07-01 RX ADMIN — FENTANYL CITRATE 25 MCG: 50 INJECTION, SOLUTION INTRAMUSCULAR; INTRAVENOUS at 09:04

## 2020-07-01 RX ADMIN — OXYCODONE HYDROCHLORIDE AND ACETAMINOPHEN 2 TABLET: 7.5; 325 TABLET ORAL at 08:58

## 2020-07-01 NOTE — ANESTHESIA POSTPROCEDURE EVALUATION
"Patient: Balbir TANG Hdez    Procedure Summary     Date:  07/01/20 Room / Location:  [facility] OR  /  [facility] OR    Anesthesia Start:  0748 Anesthesia Stop:  0846    Procedure:  KNEE ARTHROSCOPY (Right Knee) Diagnosis:  (S83.241D)    Surgeon:  Bruce Dorado MD Provider:  Clem Mejia CRNA    Anesthesia Type:  general ASA Status:  2          Anesthesia Type: general    Vitals  Vitals Value Taken Time   /78 7/1/2020  9:47 AM   Temp 98.1 °F (36.7 °C) 7/1/2020  9:30 AM   Pulse 51 7/1/2020  9:47 AM   Resp 14 7/1/2020  9:47 AM   SpO2 98 % 7/1/2020  9:47 AM           Post Anesthesia Care and Evaluation    Patient location during evaluation: PACU  Patient participation: complete - patient participated  Level of consciousness: awake and alert  Pain management: adequate  Airway patency: patent  Anesthetic complications: No anesthetic complications  PONV Status: none  Cardiovascular status: acceptable and hemodynamically stable  Respiratory status: acceptable  Hydration status: acceptable    Comments: Blood pressure 125/76, pulse 51, temperature 98.1 °F (36.7 °C), temperature source Temporal, resp. rate 14, height 178 cm (70.08\"), weight 79 kg (174 lb 2.6 oz), SpO2 96 %.    Patient discharged from PACU based upon Fawad score. Please see RN notes for further details      "

## 2020-07-01 NOTE — DISCHARGE INSTRUCTIONS

## 2020-07-01 NOTE — ANESTHESIA PROCEDURE NOTES
Airway  Urgency: elective    Date/Time: 7/1/2020 7:52 AM  Airway not difficult    General Information and Staff    Patient location during procedure: OR  CRNA: Clem Mejia CRNA    Indications and Patient Condition  Indications for airway management: airway protection    Preoxygenated: yes  Mask difficulty assessment: 1 - vent by mask    Final Airway Details  Final airway type: supraglottic airway      Successful airway: classic and I-gel  Size 4    Number of attempts at approach: 1  Assessment: lips, teeth, and gum same as pre-op

## 2020-07-01 NOTE — OP NOTE
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    PREPARATION: Routine.    STAFF: Circulator: Poornima Huffman RN  Scrub Person: Jo Hollingsworth  Assistant: Sweetie Gilmore    ESTIMATED BLOOD LOSS: None    SPECIMENS: None    COMPLICATIONS: None    INDICATIONS: Balbir Hdez is a 50 y.o. male with medial joint line discomfort and mechanical locking of his right knee.  MRI scan confirmed a complex tear of the medial meniscus of.  It was felt that an arthroscopic evaluation was indicated. The indications, risks, and possible complications of the procedure were explained to the patient, who voiced understanding and wished to proceed with surgery.  At surgery was found to have a complex tear of the medial meniscus of the right knee which was resected.  He did have grade I chondromalacia of the medial femoral condyle.  The lateral compartment was free of any pathology.  The cruciate ligaments appeared intact and functional medial tibial plateau was without major chondromalacia     PROCEDURE IN DETAIL: The patient was taken to the operating room and placed on the operating table in a Pine position. After general anesthesia was obtained, the right knee was prepped and draped in a sterile manner after exsanguinating the knee and inflating a tourniquet and placing it in a leg fallon.  An inferolateral portal was then created and the arthroscope was inserted and the knee was inflated with saline.  An inferomedial operating portal was then created using a needle guided technique to avoid the medial meniscus.  A sequential examination of the knee was carried  out and the above findings were documented.  Attention was then placed in the medial meniscus.  The loose bits of cartilaginous debris were resected with rongeurs.  A shaver was inserted to shave the residual meniscus to a smooth contour and remove any free-floating bits of meniscal debris.  The knee was then drained of any excess saline and the arthroscope and instruments were removed.  The portals were closed with nylon suture.  The knee was infiltrated with 80 mg Depo-Medrol and 2 cc of 1/2% plain Marcaine sterile dressings were applied. The patient tolerated the procedure well. Sponge and needle counts were correct. The patient was then awakened and extubated in the operating room and taken to the recovery room in good condition.  Bruce Dorado MD  Date: 7/1/2020 Time: 08:39

## 2020-07-01 NOTE — ANESTHESIA PREPROCEDURE EVALUATION
Anesthesia Evaluation     Patient summary reviewed   no history of anesthetic complications:  NPO Solid Status: > 8 hours  NPO Liquid Status: > 8 hours           Airway   Mallampati: I  TM distance: >3 FB  Neck ROM: full  Dental    (+) edentulous    Pulmonary - normal exam    breath sounds clear to auscultation  (+) a smoker (1 ppd) Current Smoked day of surgery,   (-) asthma, recent URI, sleep apnea  Cardiovascular - normal exam  Exercise tolerance: good (4-7 METS)    Rhythm: regular  Rate: normal    (-) pacemaker, hypertension, past MI, angina, cardiac stents, CABG      Neuro/Psych  (-) seizures, TIA, CVA  GI/Hepatic/Renal/Endo    (+)  GERD,    (-) liver disease, no renal disease, diabetes, no thyroid disorder    Musculoskeletal     Abdominal    Substance History      OB/GYN          Other                        Anesthesia Plan    ASA 2     general     intravenous induction     Anesthetic plan, all risks, benefits, and alternatives have been provided, discussed and informed consent has been obtained with: patient.

## 2021-01-22 ENCOUNTER — APPOINTMENT (OUTPATIENT)
Dept: GENERAL RADIOLOGY | Facility: HOSPITAL | Age: 51
End: 2021-01-22

## 2021-01-22 ENCOUNTER — HOSPITAL ENCOUNTER (EMERGENCY)
Facility: HOSPITAL | Age: 51
Discharge: HOME | End: 2021-01-22
Admitting: EMERGENCY MEDICINE

## 2021-01-22 VITALS
BODY MASS INDEX: 24.36 KG/M2 | WEIGHT: 174 LBS | RESPIRATION RATE: 14 BRPM | HEIGHT: 71 IN | TEMPERATURE: 98.4 F | DIASTOLIC BLOOD PRESSURE: 71 MMHG | OXYGEN SATURATION: 98 % | SYSTOLIC BLOOD PRESSURE: 140 MMHG | HEART RATE: 68 BPM

## 2021-01-22 DIAGNOSIS — S61.311A LACERATION OF LEFT INDEX FINGER WITHOUT FOREIGN BODY WITH DAMAGE TO NAIL, INITIAL ENCOUNTER: Primary | ICD-10-CM

## 2021-01-22 PROCEDURE — 25010000003 LIDOCAINE 1 % SOLUTION: Performed by: NURSE PRACTITIONER

## 2021-01-22 PROCEDURE — 99283 EMERGENCY DEPT VISIT LOW MDM: CPT

## 2021-01-22 PROCEDURE — 96372 THER/PROPH/DIAG INJ SC/IM: CPT

## 2021-01-22 PROCEDURE — 63710000001 ONDANSETRON ODT 4 MG TABLET DISPERSIBLE: Performed by: NURSE PRACTITIONER

## 2021-01-22 PROCEDURE — 25010000003 HYDROMORPHONE 1 MG/ML SOLUTION: Performed by: NURSE PRACTITIONER

## 2021-01-22 PROCEDURE — 73130 X-RAY EXAM OF HAND: CPT

## 2021-01-22 RX ORDER — ONDANSETRON 4 MG/1
4 TABLET, ORALLY DISINTEGRATING ORAL ONCE
Status: COMPLETED | OUTPATIENT
Start: 2021-01-22 | End: 2021-01-22

## 2021-01-22 RX ORDER — CEPHALEXIN 500 MG/1
500 CAPSULE ORAL 3 TIMES DAILY
Qty: 21 CAPSULE | Refills: 0 | Status: SHIPPED | OUTPATIENT
Start: 2021-01-22 | End: 2021-01-29

## 2021-01-22 RX ORDER — ONDANSETRON 2 MG/ML
4 INJECTION INTRAMUSCULAR; INTRAVENOUS ONCE
Status: DISCONTINUED | OUTPATIENT
Start: 2021-01-22 | End: 2021-01-22

## 2021-01-22 RX ORDER — HYDROCODONE BITARTRATE AND ACETAMINOPHEN 7.5; 325 MG/1; MG/1
1 TABLET ORAL EVERY 6 HOURS PRN
Qty: 12 TABLET | Refills: 0 | OUTPATIENT
Start: 2021-01-22 | End: 2021-02-11

## 2021-01-22 RX ORDER — LIDOCAINE HYDROCHLORIDE 10 MG/ML
10 INJECTION, SOLUTION INFILTRATION; PERINEURAL ONCE
Status: COMPLETED | OUTPATIENT
Start: 2021-01-22 | End: 2021-01-22

## 2021-01-22 RX ADMIN — HYDROMORPHONE HYDROCHLORIDE 1 MG: 1 INJECTION, SOLUTION INTRAMUSCULAR; INTRAVENOUS; SUBCUTANEOUS at 17:09

## 2021-01-22 RX ADMIN — ONDANSETRON 4 MG: 4 TABLET, ORALLY DISINTEGRATING ORAL at 17:10

## 2021-01-22 RX ADMIN — LIDOCAINE HYDROCHLORIDE 10 ML: 10 INJECTION, SOLUTION INFILTRATION; PERINEURAL at 17:16

## 2021-01-22 NOTE — ED PROVIDER NOTES
Subjective   Patient is a 50-year-old white male presents emergency department with left index finger injury.  He states he was using a wood splitter and got his finger caught in the wood splitter.  He states this occurred about 2 hours prior to arrival.  Patient is left-hand dominant.  He denies any other injury.  He states his tetanus is up-to-date.      History provided by:  Patient   used: No        Review of Systems   Constitutional: Negative.    HENT: Negative.    Eyes: Negative.    Respiratory: Negative.    Cardiovascular: Negative.    Gastrointestinal: Negative.    Endocrine: Negative.    Genitourinary: Negative.    Musculoskeletal:        Patient is a 50-year-old white male presents emergency department with left index finger injury.  He states he was using a wood splitter and got his finger caught in the wood splitter.  He states this occurred about 2 hours prior to arrival.  Patient is left-hand dominant.  He denies any other injury.  He states his tetanus is up-to-date.     Skin: Negative.    Allergic/Immunologic: Negative.    Neurological: Negative.    Hematological: Negative.    Psychiatric/Behavioral: Negative.    All other systems reviewed and are negative.      Past Medical History:   Diagnosis Date   • Acute medial meniscus tear of right knee    • Gallbladder attack    • GERD (gastroesophageal reflux disease)    • Ulcer of intestine        No Known Allergies    Past Surgical History:   Procedure Laterality Date   • HAND SURGERY Left    • HAND SURGERY Right     ORIF   • KNEE ARTHROSCOPY Right 7/1/2020    Procedure: KNEE ARTHROSCOPY;  Surgeon: Bruce Dorado MD;  Location: [facility];  Service: Orthopedics;  Laterality: Right;   • SHOULDER SURGERY Right        History reviewed. No pertinent family history.    Social History     Socioeconomic History   • Marital status: Single     Spouse name: Not on file   • Number of children: Not on file   • Years of education: Not on file  "  • Highest education level: Not on file   Tobacco Use   • Smoking status: Current Every Day Smoker     Packs/day: 1.00     Types: Cigarettes   • Smokeless tobacco: Never Used   Substance and Sexual Activity   • Alcohol use: No   • Drug use: Yes     Types: Marijuana     Comment: DAILY   • Sexual activity: Defer       Prior to Admission medications    Not on File       /71 (BP Location: Right arm, Patient Position: Sitting)   Pulse 68   Temp 98.4 °F (36.9 °C) (Oral)   Resp 14   Ht 180.3 cm (71\")   Wt 78.9 kg (174 lb)   SpO2 98%   BMI 24.27 kg/m²     Objective   Physical Exam  Vitals signs and nursing note reviewed.   Constitutional:       Appearance: He is well-developed.   HENT:      Head: Normocephalic and atraumatic.   Eyes:      Conjunctiva/sclera: Conjunctivae normal.      Pupils: Pupils are equal, round, and reactive to light.   Neck:      Musculoskeletal: Normal range of motion and neck supple.   Cardiovascular:      Rate and Rhythm: Normal rate and regular rhythm.      Heart sounds: Normal heart sounds.   Pulmonary:      Effort: Pulmonary effort is normal.      Breath sounds: Normal breath sounds.   Musculoskeletal:      Comments: Left index finger: approx 2cm laceration irregular shaped to distal aspect of left index finger involving lateral aspect of nail as well. Flexion/extenstion intact. Bleeding controlled   Skin:     General: Skin is warm and dry.   Neurological:      Mental Status: He is alert and oriented to person, place, and time.      Deep Tendon Reflexes: Reflexes are normal and symmetric.   Psychiatric:         Behavior: Behavior normal.         Thought Content: Thought content normal.         Judgment: Judgment normal.         Laceration Repair    Date/Time: 1/22/2021 5:25 PM  Performed by: Sweetie Melgar APRN  Authorized by: Sweetie Melgar APRN     Consent:     Consent obtained:  Verbal and written    Consent given by:  Patient    Risks discussed:  Infection, need " for additional repair, poor wound healing, poor cosmetic result and pain    Alternatives discussed:  No treatment, delayed treatment and observation  Anesthesia (see MAR for exact dosages):     Anesthesia method:  Local infiltration and nerve block    Local anesthetic:  Lidocaine 1% w/o epi    Block needle gauge:  25 G    Block anesthetic:  Lidocaine 1% w/o epi    Block technique:  Digital     Block injection procedure:  Anatomic landmarks identified, anatomic landmarks palpated and introduced needle    Block outcome:  Anesthesia achieved  Laceration details:     Location:  Finger    Finger location:  L index finger    Length (cm):  2.5 (irregular shaped- flap injury )    Depth (mm):  2  Repair type:     Repair type:  Simple  Pre-procedure details:     Preparation:  Patient was prepped and draped in usual sterile fashion  Exploration:     Wound exploration: wound explored through full range of motion      Wound extent: no areolar tissue violation noted, no fascia violation noted, no foreign bodies/material noted, no muscle damage noted, no nerve damage noted, no tendon damage noted, no underlying fracture noted and no vascular damage noted      Contaminated: yes    Treatment:     Area cleansed with:  Betadine, Hibiclens and saline    Amount of cleaning:  Extensive    Irrigation method:  Pressure wash and syringe    Visualized foreign bodies/material removed: no    Skin repair:     Repair method:  Sutures    Suture size:  5-0    Suture material:  Nylon    Suture technique:  Simple interrupted    Number of sutures:  8  Approximation:     Approximation:  Loose  Post-procedure details:     Dressing: bacitiaricin/adaptic/uzma/ finger splint.    Patient tolerance of procedure:  Tolerated well, no immediate complications             Lab Results (last 24 hours)     ** No results found for the last 24 hours. **          XR Hand 3+ View Left   Final Result   Soft tissue injury without acute osseous abnormality   appreciated.  Suspect retained radiopaque foreign body between the fourth   and fifth digits as above, possibly remote.   This report was finalized on 01/22/2021 17:30 by Dr. Jonathan Martínez MD.          ED Course  ED Course as of Jan 22 1857 Fri Jan 22, 2021   1757 Patient tolerated procedure well.  Well dressed the wound.  Advised the patient would prescribe antibiotics and pain medication.  Uli report completed and there is no signs of suspicious activity.  Reviewed side effects and potential for abuse.  Advised patient to return the emergency department in 10 days for suture removal.  Advised to return before if he has increased swelling, redness, drainage from the area.  Patient be discharged home shortly in stable condition    [CW]      ED Course User Index  [CW] Sweetie Melgar, WINNIE          MDM  Number of Diagnoses or Management Options  Laceration of left index finger without foreign body with damage to nail, initial encounter: minor     Amount and/or Complexity of Data Reviewed  Tests in the radiology section of CPT®: ordered and reviewed    Patient Progress  Patient progress: stable      Final diagnoses:   Laceration of left index finger without foreign body with damage to nail, initial encounter          Sweetie Melgar, WINNIE  01/22/21 1857

## 2021-01-23 NOTE — DISCHARGE INSTRUCTIONS
Return to ER if symptoms worsen   Return to er in 10 days for suture removal, return before if increased swelling, redness, drainage from the wound

## 2021-02-11 ENCOUNTER — APPOINTMENT (OUTPATIENT)
Dept: GENERAL RADIOLOGY | Facility: HOSPITAL | Age: 51
End: 2021-02-11

## 2021-02-11 ENCOUNTER — HOSPITAL ENCOUNTER (EMERGENCY)
Facility: HOSPITAL | Age: 51
Discharge: HOME | End: 2021-02-11
Admitting: EMERGENCY MEDICINE

## 2021-02-11 VITALS
RESPIRATION RATE: 15 BRPM | SYSTOLIC BLOOD PRESSURE: 125 MMHG | HEART RATE: 88 BPM | HEIGHT: 71 IN | DIASTOLIC BLOOD PRESSURE: 85 MMHG | TEMPERATURE: 98.4 F | WEIGHT: 168 LBS | BODY MASS INDEX: 23.52 KG/M2 | OXYGEN SATURATION: 98 %

## 2021-02-11 DIAGNOSIS — S69.92XA INJURY OF FINGER OF LEFT HAND, INITIAL ENCOUNTER: Primary | ICD-10-CM

## 2021-02-11 PROCEDURE — 73140 X-RAY EXAM OF FINGER(S): CPT

## 2021-02-11 PROCEDURE — 99283 EMERGENCY DEPT VISIT LOW MDM: CPT

## 2021-02-11 RX ORDER — HYDROCODONE BITARTRATE AND ACETAMINOPHEN 7.5; 325 MG/1; MG/1
1 TABLET ORAL ONCE
Status: COMPLETED | OUTPATIENT
Start: 2021-02-11 | End: 2021-02-11

## 2021-02-11 RX ADMIN — HYDROCODONE BITARTRATE AND ACETAMINOPHEN 1 TABLET: 7.5; 325 TABLET ORAL at 14:28

## 2021-02-11 NOTE — ED PROVIDER NOTES
Subjective   51 yom presents with c/o wound to left index finger. He states he injured his left index finger on 01/22/21 with a stump . He states he was seen here and had sutures placed.  He states he removed them on his own approximately 14 days later and his finger hasn't healed completely.  He states he is still having pain in the finger and the laceration is open.  He denies fever.  He has full ROM of the index finger as well as the other fingers of the left hand.  The distal end of the index finger has a healing wound. The wound is open but no redness or drainage is present.  He states he was prescribed antibiotics which he has finished.           Review of Systems   Constitutional: Negative for activity change, appetite change, fatigue and fever.   HENT: Negative for congestion, ear pain, facial swelling and sore throat.    Eyes: Negative for discharge and visual disturbance.   Respiratory: Negative for apnea, chest tightness, shortness of breath, wheezing and stridor.    Cardiovascular: Negative for chest pain and palpitations.   Gastrointestinal: Negative for abdominal distention, abdominal pain, diarrhea, nausea and vomiting.   Genitourinary: Negative for difficulty urinating and dysuria.   Musculoskeletal: Negative for arthralgias and myalgias.   Skin: Positive for wound. Negative for rash.   Neurological: Negative for dizziness and seizures.   Psychiatric/Behavioral: Negative for agitation and confusion.       Past Medical History:   Diagnosis Date   • Acute medial meniscus tear of right knee    • Gallbladder attack    • GERD (gastroesophageal reflux disease)    • Ulcer of intestine        No Known Allergies    Past Surgical History:   Procedure Laterality Date   • HAND SURGERY Left    • HAND SURGERY Right     ORIF   • KNEE ARTHROSCOPY Right 7/1/2020    Procedure: KNEE ARTHROSCOPY;  Surgeon: Bruce Dorado MD;  Location: [facility];  Service: Orthopedics;  Laterality: Right;   • SHOULDER  SURGERY Right        No family history on file.    Social History     Socioeconomic History   • Marital status: Single     Spouse name: Not on file   • Number of children: Not on file   • Years of education: Not on file   • Highest education level: Not on file   Tobacco Use   • Smoking status: Current Every Day Smoker     Packs/day: 1.00     Types: Cigarettes   • Smokeless tobacco: Never Used   Substance and Sexual Activity   • Alcohol use: No   • Drug use: Yes     Types: Marijuana     Comment: DAILY   • Sexual activity: Defer           Objective   Physical Exam  Constitutional:       Appearance: He is well-developed.   HENT:      Head: Normocephalic.   Eyes:      Pupils: Pupils are equal, round, and reactive to light.   Neck:      Musculoskeletal: Normal range of motion and neck supple.   Cardiovascular:      Rate and Rhythm: Normal rate and regular rhythm.      Heart sounds: No murmur.   Pulmonary:      Effort: Pulmonary effort is normal.      Breath sounds: Normal breath sounds.   Abdominal:      General: Bowel sounds are normal.      Palpations: Abdomen is soft.   Musculoskeletal: Normal range of motion.      Left hand: He exhibits tenderness and laceration. He exhibits normal range of motion, normal capillary refill and no deformity.        Hands:       Comments:  He has full ROM of the index finger as well as the other fingers of the left hand.  The distal end of the index finger has a healing wound. The wound is open but no redness or drainage is present.       Skin:     General: Skin is warm and dry.   Neurological:      Mental Status: He is alert and oriented to person, place, and time.         Procedures           ED Course  ED Course as of Feb 11 1710   Thu Feb 11, 2021   4764 I discussed testing results with the patient.  He voiced understanding of results as well as d'c instructions.     [KS]      ED Course User Index  [KS] Aron Melgar APRN                                            MDM  Number of Diagnoses or Management Options  Injury of finger of left hand, initial encounter: minor     Amount and/or Complexity of Data Reviewed  Tests in the radiology section of CPT®: ordered and reviewed  Discuss the patient with other providers: yes  Independent visualization of images, tracings, or specimens: yes    Risk of Complications, Morbidity, and/or Mortality  Presenting problems: minimal  Diagnostic procedures: minimal  Management options: minimal    Patient Progress  Patient progress: stable      Final diagnoses:   Injury of finger of left hand, initial encounter            Aron Melgar, APRN  02/11/21 7010

## 2021-02-11 NOTE — DISCHARGE INSTRUCTIONS
Drink plenty of fluid.  Tylenol or motrin as needed for pain/fever.  Wash area three times a day with antibacterial soap and apply antibiotic ointment and non stick dressing. Follow up with PCP - call for appointment. Return to ED if condition does not improve or worsens

## 2023-05-02 ENCOUNTER — APPOINTMENT (OUTPATIENT)
Dept: CT IMAGING | Facility: HOSPITAL | Age: 53
End: 2023-05-02
Payer: COMMERCIAL

## 2023-05-02 ENCOUNTER — APPOINTMENT (OUTPATIENT)
Dept: GENERAL RADIOLOGY | Facility: HOSPITAL | Age: 53
End: 2023-05-02
Payer: COMMERCIAL

## 2023-05-02 ENCOUNTER — HOSPITAL ENCOUNTER (EMERGENCY)
Facility: HOSPITAL | Age: 53
Discharge: HOME | End: 2023-05-02
Admitting: FAMILY MEDICINE
Payer: COMMERCIAL

## 2023-05-02 VITALS
HEART RATE: 58 BPM | OXYGEN SATURATION: 97 % | RESPIRATION RATE: 16 BRPM | WEIGHT: 162 LBS | DIASTOLIC BLOOD PRESSURE: 80 MMHG | SYSTOLIC BLOOD PRESSURE: 125 MMHG | TEMPERATURE: 98.7 F | BODY MASS INDEX: 22.68 KG/M2 | HEIGHT: 71 IN

## 2023-05-02 DIAGNOSIS — S73.101A HIP SPRAIN, RIGHT, INITIAL ENCOUNTER: ICD-10-CM

## 2023-05-02 DIAGNOSIS — S22.49XA CLOSED FRACTURE OF MULTIPLE RIBS, UNSPECIFIED LATERALITY, INITIAL ENCOUNTER: ICD-10-CM

## 2023-05-02 DIAGNOSIS — S20.211A CONTUSION OF RIGHT CHEST WALL, INITIAL ENCOUNTER: Primary | ICD-10-CM

## 2023-05-02 LAB
ALBUMIN SERPL-MCNC: 4.9 G/DL (ref 3.5–5.2)
ALBUMIN/GLOB SERPL: 1.9 G/DL
ALP SERPL-CCNC: 66 U/L (ref 39–117)
ALT SERPL W P-5'-P-CCNC: 11 U/L (ref 1–41)
ANION GAP SERPL CALCULATED.3IONS-SCNC: 9 MMOL/L (ref 5–15)
AST SERPL-CCNC: 21 U/L (ref 1–40)
BASOPHILS # BLD AUTO: 0.05 10*3/MM3 (ref 0–0.2)
BASOPHILS NFR BLD AUTO: 0.4 % (ref 0–1.5)
BILIRUB SERPL-MCNC: 0.3 MG/DL (ref 0–1.2)
BILIRUB UR QL STRIP: NEGATIVE
BUN SERPL-MCNC: 15 MG/DL (ref 6–20)
BUN/CREAT SERPL: 20.5 (ref 7–25)
CALCIUM SPEC-SCNC: 9.5 MG/DL (ref 8.6–10.5)
CHLORIDE SERPL-SCNC: 102 MMOL/L (ref 98–107)
CLARITY UR: CLEAR
CO2 SERPL-SCNC: 25 MMOL/L (ref 22–29)
COLOR UR: YELLOW
CREAT SERPL-MCNC: 0.73 MG/DL (ref 0.76–1.27)
DEPRECATED RDW RBC AUTO: 45.1 FL (ref 37–54)
EGFRCR SERPLBLD CKD-EPI 2021: 108.8 ML/MIN/1.73
EOSINOPHIL # BLD AUTO: 0.16 10*3/MM3 (ref 0–0.4)
EOSINOPHIL NFR BLD AUTO: 1.2 % (ref 0.3–6.2)
ERYTHROCYTE [DISTWIDTH] IN BLOOD BY AUTOMATED COUNT: 13.6 % (ref 12.3–15.4)
GLOBULIN UR ELPH-MCNC: 2.6 GM/DL
GLUCOSE SERPL-MCNC: 103 MG/DL (ref 65–99)
GLUCOSE UR STRIP-MCNC: NEGATIVE MG/DL
HCT VFR BLD AUTO: 43.9 % (ref 37.5–51)
HGB BLD-MCNC: 14 G/DL (ref 13–17.7)
HGB UR QL STRIP.AUTO: NEGATIVE
IMM GRANULOCYTES # BLD AUTO: 0.05 10*3/MM3 (ref 0–0.05)
IMM GRANULOCYTES NFR BLD AUTO: 0.4 % (ref 0–0.5)
KETONES UR QL STRIP: NEGATIVE
LEUKOCYTE ESTERASE UR QL STRIP.AUTO: NEGATIVE
LIPASE SERPL-CCNC: 26 U/L (ref 13–60)
LYMPHOCYTES # BLD AUTO: 2.92 10*3/MM3 (ref 0.7–3.1)
LYMPHOCYTES NFR BLD AUTO: 21.4 % (ref 19.6–45.3)
MCH RBC QN AUTO: 28.8 PG (ref 26.6–33)
MCHC RBC AUTO-ENTMCNC: 31.9 G/DL (ref 31.5–35.7)
MCV RBC AUTO: 90.3 FL (ref 79–97)
MONOCYTES # BLD AUTO: 0.67 10*3/MM3 (ref 0.1–0.9)
MONOCYTES NFR BLD AUTO: 4.9 % (ref 5–12)
NEUTROPHILS NFR BLD AUTO: 71.7 % (ref 42.7–76)
NEUTROPHILS NFR BLD AUTO: 9.82 10*3/MM3 (ref 1.7–7)
NITRITE UR QL STRIP: NEGATIVE
NRBC BLD AUTO-RTO: 0 /100 WBC (ref 0–0.2)
PH UR STRIP.AUTO: 6.5 [PH] (ref 5–8)
PLATELET # BLD AUTO: 293 10*3/MM3 (ref 140–450)
PMV BLD AUTO: 8.9 FL (ref 6–12)
POTASSIUM SERPL-SCNC: 4.3 MMOL/L (ref 3.5–5.2)
PROT SERPL-MCNC: 7.5 G/DL (ref 6–8.5)
PROT UR QL STRIP: NEGATIVE
RBC # BLD AUTO: 4.86 10*6/MM3 (ref 4.14–5.8)
SODIUM SERPL-SCNC: 136 MMOL/L (ref 136–145)
SP GR UR STRIP: 1.02 (ref 1–1.03)
TROPONIN T SERPL HS-MCNC: 10 NG/L
UROBILINOGEN UR QL STRIP: NORMAL
WBC NRBC COR # BLD: 13.67 10*3/MM3 (ref 3.4–10.8)

## 2023-05-02 PROCEDURE — 74177 CT ABD & PELVIS W/CONTRAST: CPT

## 2023-05-02 PROCEDURE — 93010 ELECTROCARDIOGRAM REPORT: CPT | Performed by: STUDENT IN AN ORGANIZED HEALTH CARE EDUCATION/TRAINING PROGRAM

## 2023-05-02 PROCEDURE — 93005 ELECTROCARDIOGRAM TRACING: CPT | Performed by: NURSE PRACTITIONER

## 2023-05-02 PROCEDURE — 80053 COMPREHEN METABOLIC PANEL: CPT | Performed by: NURSE PRACTITIONER

## 2023-05-02 PROCEDURE — 25010000002 HYDROMORPHONE PER 4 MG: Performed by: NURSE PRACTITIONER

## 2023-05-02 PROCEDURE — 25510000001 IOPAMIDOL 61 % SOLUTION: Performed by: NURSE PRACTITIONER

## 2023-05-02 PROCEDURE — 25010000002 ONDANSETRON PER 1 MG: Performed by: NURSE PRACTITIONER

## 2023-05-02 PROCEDURE — 99283 EMERGENCY DEPT VISIT LOW MDM: CPT

## 2023-05-02 PROCEDURE — 81003 URINALYSIS AUTO W/O SCOPE: CPT | Performed by: NURSE PRACTITIONER

## 2023-05-02 PROCEDURE — 84484 ASSAY OF TROPONIN QUANT: CPT | Performed by: NURSE PRACTITIONER

## 2023-05-02 PROCEDURE — 71045 X-RAY EXAM CHEST 1 VIEW: CPT

## 2023-05-02 PROCEDURE — 85025 COMPLETE CBC W/AUTO DIFF WBC: CPT | Performed by: NURSE PRACTITIONER

## 2023-05-02 PROCEDURE — 71260 CT THORAX DX C+: CPT

## 2023-05-02 PROCEDURE — 96375 TX/PRO/DX INJ NEW DRUG ADDON: CPT

## 2023-05-02 PROCEDURE — 73502 X-RAY EXAM HIP UNI 2-3 VIEWS: CPT

## 2023-05-02 PROCEDURE — 83690 ASSAY OF LIPASE: CPT | Performed by: NURSE PRACTITIONER

## 2023-05-02 PROCEDURE — 96374 THER/PROPH/DIAG INJ IV PUSH: CPT

## 2023-05-02 RX ORDER — OXYCODONE HYDROCHLORIDE AND ACETAMINOPHEN 5; 325 MG/1; MG/1
1 TABLET ORAL EVERY 8 HOURS PRN
Qty: 9 TABLET | Refills: 0 | Status: SHIPPED | OUTPATIENT
Start: 2023-05-02

## 2023-05-02 RX ORDER — NAPROXEN 500 MG/1
500 TABLET ORAL 2 TIMES DAILY PRN
Qty: 20 TABLET | Refills: 0 | Status: SHIPPED | OUTPATIENT
Start: 2023-05-02

## 2023-05-02 RX ORDER — SODIUM CHLORIDE 0.9 % (FLUSH) 0.9 %
10 SYRINGE (ML) INJECTION AS NEEDED
Status: DISCONTINUED | OUTPATIENT
Start: 2023-05-02 | End: 2023-05-02 | Stop reason: HOSPADM

## 2023-05-02 RX ORDER — ONDANSETRON 2 MG/ML
4 INJECTION INTRAMUSCULAR; INTRAVENOUS ONCE
Status: COMPLETED | OUTPATIENT
Start: 2023-05-02 | End: 2023-05-02

## 2023-05-02 RX ORDER — HYDROMORPHONE HYDROCHLORIDE 1 MG/ML
0.5 INJECTION, SOLUTION INTRAMUSCULAR; INTRAVENOUS; SUBCUTANEOUS ONCE
Status: COMPLETED | OUTPATIENT
Start: 2023-05-02 | End: 2023-05-02

## 2023-05-02 RX ADMIN — ONDANSETRON 4 MG: 2 INJECTION INTRAMUSCULAR; INTRAVENOUS at 15:20

## 2023-05-02 RX ADMIN — HYDROMORPHONE HYDROCHLORIDE 0.5 MG: 1 INJECTION, SOLUTION INTRAMUSCULAR; INTRAVENOUS; SUBCUTANEOUS at 15:21

## 2023-05-02 RX ADMIN — IOPAMIDOL 100 ML: 612 INJECTION, SOLUTION INTRAVENOUS at 15:43

## 2023-05-02 NOTE — ED PROVIDER NOTES
Subjective   History of Present Illness  Patient is a 53-year-old male who presents to the ER with chief complaints of right hip and right abdominal pain.  Patient was loading very large wood logs onto a truck.  He estimates they were greater than 200 pounds.  He states 1 large log fell off and hit him in his right hip, right abdomen, right rib region.  He is having significant pain to his right hip and abdomen.  He denies hitting his head.  He states the log knocked him backwards but he did not hit his head.  He had no loss of consciousness.  He denies any back pain.  He has had no loss of bowel or bladder control or saddle anesthesia.  Past medical history significant for meniscus tear of the right knee, GERD, gallbladder colic.        Review of Systems   Constitutional: Negative.  Negative for fever.   HENT: Negative.  Negative for congestion.    Eyes: Negative.    Respiratory: Negative for cough and shortness of breath.         Positive for rib pain   Cardiovascular: Positive for chest pain.   Gastrointestinal: Positive for abdominal pain. Negative for diarrhea, nausea and vomiting.   Genitourinary: Negative.  Negative for flank pain and hematuria.   Musculoskeletal: Negative for back pain.        Positive for right hip pain   Skin: Positive for wound.        Abrasion and bruising noted to the right lateral rib and right upper abdomen   All other systems reviewed and are negative.      Past Medical History:   Diagnosis Date   • Acute medial meniscus tear of right knee    • Gallbladder attack    • GERD (gastroesophageal reflux disease)    • Ulcer of intestine        No Known Allergies    Past Surgical History:   Procedure Laterality Date   • HAND SURGERY Left    • HAND SURGERY Right     ORIF   • KNEE ARTHROSCOPY Right 7/1/2020    Procedure: KNEE ARTHROSCOPY;  Surgeon: Bruce Dorado MD;  Location: [facility];  Service: Orthopedics;  Laterality: Right;   • SHOULDER SURGERY Right        History reviewed. No  pertinent family history.    Social History     Socioeconomic History   • Marital status: Single   Tobacco Use   • Smoking status: Every Day     Packs/day: 1.00     Types: Cigarettes   • Smokeless tobacco: Never   Substance and Sexual Activity   • Alcohol use: No   • Drug use: Yes     Types: Marijuana     Comment: DAILY   • Sexual activity: Defer           Objective   Physical Exam  Vitals and nursing note reviewed.   Constitutional:       General: He is in acute distress.      Appearance: He is well-developed. He is not diaphoretic.   HENT:      Head: Normocephalic and atraumatic.      Right Ear: External ear normal.      Left Ear: External ear normal.      Nose: Nose normal.      Mouth/Throat:      Pharynx: Oropharynx is clear.   Eyes:      General: No scleral icterus.     Extraocular Movements: Extraocular movements intact.      Conjunctiva/sclera: Conjunctivae normal.   Neck:      Thyroid: No thyromegaly.      Vascular: No JVD.      Comments: Patient has no pain to palpation noted on the cervical, thoracic, or lumbar spine.  Cardiovascular:      Rate and Rhythm: Normal rate and regular rhythm.      Heart sounds: Normal heart sounds. No murmur heard.  Pulmonary:      Effort: Pulmonary effort is normal. No respiratory distress.      Breath sounds: Normal breath sounds. No wheezing or rales.      Comments: Patient has pain to palpation to the lateral right lower rib with abrasion noted  Chest:      Chest wall: Tenderness present.   Abdominal:      General: Bowel sounds are normal. There is no distension.      Palpations: Abdomen is soft. There is no mass.      Tenderness: There is abdominal tenderness. There is no guarding or rebound.      Comments: Right upper abdominal pain noted to palpation and right anterior and lateral rib pain.  Abrasion noted just below the right lateral/anterior rib just above the abdomen.  Bowel sounds noted.  No abdominal distention noted.   Musculoskeletal:         General: Normal range  of motion.      Cervical back: Normal range of motion and neck supple. No tenderness.      Comments: Patient has no pain noted to the thoracic or lumbar spine, no step-off or vertebral point tenderness noted.  He denies any loss of bowel or bladder control or saddle anesthesia.  Patient has pain to palpation to the right hip with limited range of motion due to pain.   Lymphadenopathy:      Cervical: No cervical adenopathy.   Skin:     General: Skin is warm and dry.      Capillary Refill: Capillary refill takes less than 2 seconds.      Coloration: Skin is not pale.      Findings: No erythema or rash.   Neurological:      Mental Status: He is alert and oriented to person, place, and time.      Cranial Nerves: No cranial nerve deficit.      Coordination: Coordination normal.      Deep Tendon Reflexes: Reflexes are normal and symmetric.   Psychiatric:         Mood and Affect: Mood normal.         Behavior: Behavior normal.         Thought Content: Thought content normal.         Judgment: Judgment normal.         Procedures           ED Course                                           Medical Decision Making  Patient is a 53-year-old male who presents to the ER with chief complaints of right hip and right abdominal pain.  Patient was loading very large wood logs onto a truck.  He estimates they were greater than 200 pounds.  He states 1 large log fell off and hit him in his right hip, right abdomen, right rib region.  He is having significant pain to his right hip and abdomen.  He denies hitting his head.  He states the log knocked him backwards but he did not hit his head.  He had no loss of consciousness.  He denies any back pain.  He has had no loss of bowel or bladder control or saddle anesthesia.  Past medical history significant for meniscus tear of the right knee, GERD, gallbladder colic.  Differential diagnosis: Rib fracture, hip/bony fracture, liver laceration, abdominal trauma, and other    Labs  Reviewed  COMPREHENSIVE METABOLIC PANEL - Abnormal; Notable for the following components:     Glucose                       103 (*)                Creatinine                    0.73 (*)            All other components within normal limits         Narrative: GFR Normal >60                  Chronic Kidney Disease <60                  Kidney Failure <15                    CBC WITH AUTO DIFFERENTIAL - Abnormal; Notable for the following components:     WBC                           13.67 (*)               Monocyte %                    4.9 (*)                Neutrophils, Absolute         9.82 (*)            All other components within normal limits  LIPASE - Normal  SINGLE HSTROPONIN T - Normal         Narrative: High Sensitive Troponin T Reference Range:                  <10.0 ng/L- Negative Female for AMI                  <15.0 ng/L- Negative Male for AMI                  >=10 - Abnormal Female indicating possible myocardial injury.                  >=15 - Abnormal Male indicating possible myocardial injury.                   Clinicians would have to utilize clinical acumen, EKG, Troponin, and serial changes to determine if it is an Acute Myocardial Infarction or myocardial injury due to an underlying chronic condition.                                       URINALYSIS W/ MICROSCOPIC IF INDICATED (NO CULTURE) - Normal         Narrative: Urine microscopic not indicated.  CBC AND DIFFERENTIAL  XR Hip With or Without Pelvis 2 - 3 View Right   Final Result    1. No acute osseous injury identified of the bony pelvis or right hip.    This report was finalized on 05/02/2023 16:01 by Dr. Wanda Reyes MD.     XR Chest 1 View   Final Result    1. Grid line artifacts.    2. Bronchial wall thickening, stable.    3. No acute appearing infiltrate or effusion.              This report was finalized on 05/02/2023 16:00 by Dr. Jonh Guzmán MD.     CT Chest With Contrast Diagnostic   Final Result    1. Age-indeterminate buckle type  deformities of the LEFT lateral sixth    and seventh ribs. Correlate for point tenderness.    2. No acute cardiopulmonary finding.    3. See separately dictated CT chest of the same day.    This report was finalized on 05/02/2023 15:53 by Dr Lisa Montalvo MD.     CT Abdomen Pelvis With Contrast   Final Result    1. No solid organ injury or hemoperitoneum.    2. Tiny 2 mm nonobstructive stone right kidney.    3. Thickening of the gastric wall versus artifact of underdistention.    Gastritis and other etiologies are considered.    4. Other nonacute findings, as discussed above.         The full report of this exam was immediately signed and available to the    emergency room. The patient is currently in the emergency room.    This report was finalized on 05/02/2023 15:47 by Dr. Jonh Guzmán MD.     Patient has no intra abdominal injuries noted. He has 2 rib fractures noted without pneumothorax.  He has hip sprain with no fracture noted per xray or ct scan. He will be discharged home in stable condition.     Closed fracture of multiple ribs, unspecified laterality, initial encounter: acute illness or injury  Contusion of right chest wall, initial encounter: acute illness or injury  Hip sprain, right, initial encounter: acute illness or injury  Amount and/or Complexity of Data Reviewed  Labs: ordered. Decision-making details documented in ED Course.  Radiology: ordered. Decision-making details documented in ED Course.  ECG/medicine tests: ordered. Decision-making details documented in ED Course.      Risk  Prescription drug management.          Final diagnoses:   Contusion of right chest wall, initial encounter   Hip sprain, right, initial encounter   Closed fracture of multiple ribs, unspecified laterality, initial encounter       ED Disposition  ED Disposition     ED Disposition   Discharge    Condition   Good    Comment   --             No follow-up provider specified.       Medication List      New Prescriptions     naproxen 500 MG tablet  Commonly known as: NAPROSYN  Take 1 tablet by mouth 2 (Two) Times a Day As Needed for Mild Pain.     oxyCODONE-acetaminophen 5-325 MG per tablet  Commonly known as: PERCOCET  Take 1 tablet by mouth Every 8 (Eight) Hours As Needed for Moderate Pain.           Where to Get Your Medications      These medications were sent to [facility]/pharmacy #[id number] - [place], [place] - [address] - [phone]  - [phone]   [address] [place] [address]    Phone: [phone]   · naproxen 500 MG tablet  · oxyCODONE-acetaminophen 5-325 MG per tablet          Tanisha Tai, APRN  05/02/23 1734

## 2023-05-02 NOTE — DISCHARGE INSTRUCTIONS
F/u with pcp for re-evaluation    The patient is a 44 y.o. old female who presents to the office for a physical.  Review of labs reveals essentially normal results.    PAST MEDICAL HISTORY  Past Medical History:   Diagnosis Date    Anxiety     Depression        SURGICAL HISTORY:  No past surgical history on file.      MEDS:  Medcard reviewed and updated    ALLERGIES: Allergy Card reviewed and updated    SOCIAL HISTORY:   The patient is a nonsmoker, social alcohol, denies illicit drug use.    ROS:  GENERAL: No fever, chills, fatigability or weight loss.  SKIN: No rashes.  HEAD: No headaches or recent head trauma.  EYES: No photophobia, ocular pain or diplopia.  EARS: Denies ear pain, discharge or vertigo.  NOSE: No epistaxis or postnasal drip.  MOUTH & THROAT: No hoarseness or change in voice.   NODES: Denies swollen glands.  CHEST: Denies shortness of breath, wheezing, cough and sputum production.  CARDIOVASCULAR: Denies chest pain or palpitations.  ABDOMEN: Appetite fine. Denies diarrhea, abdominal pain, constipation or blood in stool.  URINARY: No dysuria or hematuria.  MUSCULOSKELETAL: No joint stiffness or swelling. Positive back pain.  NEUROLOGIC: No history of seizures.  ENDOCRINE: Denies polyuria or polydipsia.  PSYCHIATRIC: Denies mood swings, homicidal or suicidal thoughts.  Positive anxiety and depression.    SCREENINGS:  Last cholesterol: October 2017  Last colonoscopy: None  Last mammogram: March 22, 2017  Last Pap smear: February 6, 2017  Last tetanus: 2015  Last Pneumovax: None  Last eye exam: 3 months ago  Last bone density: none  Last menstrual period: about 2 weeks ago    PE:   Vitals:  Vitals:    10/11/17 1305   BP: 100/78   Pulse: 72   Resp: 14   Temp: 97.5 °F (36.4 °C)       APPEARANCE: Well nourished, well developed, in no acute distress.    EYES: Sclerae anicteric. PERRL. EOMI.      EARS: TM's intact. No retraction or perforation.    NOSE: Mucosa pink. Airway clear.  MOUTH & THROAT: No tonsillar enlargement. No pharyngeal  erythema or exudate. No stridor.  NECK: Supple, no thyromegaly.  CHEST: Lungs clear to auscultation with unlabored respirations.  CARDIOVASCULAR: Normal S1, S2. No murmurs. No carotid bruits. No pedal edema.  ABDOMEN: Bowel sounds normal. Not distended. Soft. No tenderness or masses.  MUSCULOSKELETAL:  Normal gait, no cyanosis or clubbing.   SKIN: Normal skin turgor, warm and dry.  NEUROLOGIC: Cranial Nerves: Intact.  PSYCHIATRIC: The patient is oriented to person, place, and time and has a pleasant affect.        ASSESSMENT/PLAN:  Paris was seen today for annual exam.    Diagnoses and all orders for this visit:    Routine general medical examination at a health care facility  -     Labs reviewed  -     Patient declines flu vaccine           Answers for HPI/ROS submitted by the patient on 10/10/2017   activity change: No  unexpected weight change: No  neck pain: No  hearing loss: No  rhinorrhea: No  trouble swallowing: No  eye discharge: No  visual disturbance: No  chest tightness: No  wheezing: No  chest pain: No  palpitations: No  blood in stool: No  constipation: No  vomiting: No  diarrhea: No  polydipsia: No  polyuria: No  difficulty urinating: No  hematuria: No  menstrual problem: No  dysuria: No  joint swelling: No  arthralgias: No  headaches: No  weakness: No  confusion: No  dysphoric mood: No

## 2023-05-03 LAB
QT INTERVAL: 384 MS
QTC INTERVAL: 405 MS

## 2023-08-13 ENCOUNTER — APPOINTMENT (OUTPATIENT)
Dept: GENERAL RADIOLOGY | Facility: HOSPITAL | Age: 53
End: 2023-08-13
Payer: COMMERCIAL

## 2023-08-13 ENCOUNTER — HOSPITAL ENCOUNTER (EMERGENCY)
Facility: HOSPITAL | Age: 53
Discharge: HOME | End: 2023-08-13
Payer: COMMERCIAL

## 2023-08-13 VITALS
SYSTOLIC BLOOD PRESSURE: 132 MMHG | HEIGHT: 71 IN | TEMPERATURE: 98 F | OXYGEN SATURATION: 99 % | DIASTOLIC BLOOD PRESSURE: 78 MMHG | BODY MASS INDEX: 21.56 KG/M2 | RESPIRATION RATE: 20 BRPM | HEART RATE: 76 BPM | WEIGHT: 154 LBS

## 2023-08-13 DIAGNOSIS — S46.911A SHOULDER STRAIN, RIGHT, INITIAL ENCOUNTER: Primary | ICD-10-CM

## 2023-08-13 PROCEDURE — 25010000002 KETOROLAC TROMETHAMINE PER 15 MG: Performed by: NURSE PRACTITIONER

## 2023-08-13 PROCEDURE — 63710000001 ONDANSETRON ODT 4 MG TABLET DISPERSIBLE: Performed by: NURSE PRACTITIONER

## 2023-08-13 PROCEDURE — 99283 EMERGENCY DEPT VISIT LOW MDM: CPT

## 2023-08-13 PROCEDURE — 71101 X-RAY EXAM UNILAT RIBS/CHEST: CPT

## 2023-08-13 PROCEDURE — 73030 X-RAY EXAM OF SHOULDER: CPT

## 2023-08-13 PROCEDURE — 96372 THER/PROPH/DIAG INJ SC/IM: CPT

## 2023-08-13 RX ORDER — ONDANSETRON 4 MG/1
4 TABLET, ORALLY DISINTEGRATING ORAL ONCE
Status: COMPLETED | OUTPATIENT
Start: 2023-08-13 | End: 2023-08-13

## 2023-08-13 RX ORDER — KETOROLAC TROMETHAMINE 30 MG/ML
30 INJECTION, SOLUTION INTRAMUSCULAR; INTRAVENOUS ONCE
Status: COMPLETED | OUTPATIENT
Start: 2023-08-13 | End: 2023-08-13

## 2023-08-13 RX ORDER — HYDROCODONE BITARTRATE AND ACETAMINOPHEN 7.5; 325 MG/1; MG/1
1 TABLET ORAL ONCE
Status: COMPLETED | OUTPATIENT
Start: 2023-08-13 | End: 2023-08-13

## 2023-08-13 RX ORDER — CYCLOBENZAPRINE HCL 10 MG
10 TABLET ORAL 3 TIMES DAILY PRN
Qty: 15 TABLET | Refills: 0 | Status: SHIPPED | OUTPATIENT
Start: 2023-08-13

## 2023-08-13 RX ORDER — NAPROXEN 500 MG/1
500 TABLET ORAL 2 TIMES DAILY PRN
Qty: 15 TABLET | Refills: 0 | Status: SHIPPED | OUTPATIENT
Start: 2023-08-13

## 2023-08-13 RX ORDER — OXYCODONE HYDROCHLORIDE AND ACETAMINOPHEN 5; 325 MG/1; MG/1
1 TABLET ORAL EVERY 4 HOURS PRN
Qty: 12 TABLET | Refills: 0 | Status: SHIPPED | OUTPATIENT
Start: 2023-08-13

## 2023-08-13 RX ADMIN — ONDANSETRON 4 MG: 4 TABLET, ORALLY DISINTEGRATING ORAL at 19:06

## 2023-08-13 RX ADMIN — HYDROCODONE BITARTRATE AND ACETAMINOPHEN 1 TABLET: 7.5; 325 TABLET ORAL at 19:06

## 2023-08-13 RX ADMIN — KETOROLAC TROMETHAMINE 30 MG: 30 INJECTION, SOLUTION INTRAMUSCULAR; INTRAVENOUS at 19:06

## 2023-08-14 NOTE — DISCHARGE INSTRUCTIONS
F/u with orthopedics - Dr. Yousif on call  No heavy lifting; call tomorrow for appt    Provide patient with list of PCPs

## 2023-08-25 NOTE — ED PROVIDER NOTES
Subjective   History of Present Illness  Patient is a 53-year-old male who presents to the ER with chief complaints of right shoulder pain.  He states he was lifting a washer and dryer 3 days ago and since this time he has had pain to the right shoulder, indicates having pain to the shoulder and issues with the shoulder in the past.  Past medical history significant for meniscus tear of the right knee, gallbladder dysfunction, GERD, intestinal ulcer.      Review of Systems   Constitutional: Negative.  Negative for fever.   HENT: Negative.  Negative for congestion.    Respiratory: Negative.  Negative for cough and shortness of breath.    Cardiovascular: Negative.  Negative for chest pain.   Gastrointestinal: Negative.  Negative for abdominal pain, diarrhea, nausea and vomiting.   Genitourinary: Negative.    Musculoskeletal:         Positive for right shoulder pain   Skin: Negative.    Neurological: Negative.  Negative for syncope.   All other systems reviewed and are negative.    Past Medical History:   Diagnosis Date    Acute medial meniscus tear of right knee     Gallbladder attack     GERD (gastroesophageal reflux disease)     Ulcer of intestine        No Known Allergies    Past Surgical History:   Procedure Laterality Date    HAND SURGERY Left     HAND SURGERY Right     ORIF    KNEE ARTHROSCOPY Right 7/1/2020    Procedure: KNEE ARTHROSCOPY;  Surgeon: Bruce Dorado MD;  Location: [facility];  Service: Orthopedics;  Laterality: Right;    SHOULDER SURGERY Right        History reviewed. No pertinent family history.    Social History     Socioeconomic History    Marital status: Single   Tobacco Use    Smoking status: Every Day     Packs/day: 1.00     Types: Cigarettes    Smokeless tobacco: Never   Substance and Sexual Activity    Alcohol use: No    Drug use: Yes     Types: Marijuana     Comment: DAILY    Sexual activity: Defer           Objective   Physical Exam  Vitals and nursing note reviewed.    Constitutional:       General: He is not in acute distress.     Appearance: He is well-developed. He is not diaphoretic.   HENT:      Head: Atraumatic.      Right Ear: External ear normal.      Left Ear: External ear normal.      Nose: Nose normal.      Mouth/Throat:      Pharynx: Oropharynx is clear.   Eyes:      General: No scleral icterus.     Extraocular Movements: Extraocular movements intact.      Conjunctiva/sclera: Conjunctivae normal.   Neck:      Thyroid: No thyromegaly.      Vascular: No JVD.   Cardiovascular:      Rate and Rhythm: Normal rate and regular rhythm.      Heart sounds: Normal heart sounds. No murmur heard.  Pulmonary:      Effort: Pulmonary effort is normal. No respiratory distress.      Breath sounds: Normal breath sounds. No wheezing or rales.   Chest:      Chest wall: No tenderness.   Abdominal:      General: Bowel sounds are normal. There is no distension.      Palpations: Abdomen is soft. There is no mass.      Tenderness: There is no abdominal tenderness. There is no guarding or rebound.   Musculoskeletal:         General: Tenderness present.      Cervical back: Normal range of motion and neck supple.      Comments: Pain to palpation of the right shoulder with limited range of motion due to pain, pulses palpable bilaterally, patient is neurologically neurovascularly intact   Lymphadenopathy:      Cervical: No cervical adenopathy.   Skin:     General: Skin is warm and dry.      Coloration: Skin is not pale.      Findings: No erythema or rash.   Neurological:      General: No focal deficit present.      Mental Status: He is alert and oriented to person, place, and time.      Cranial Nerves: No cranial nerve deficit.      Coordination: Coordination normal.      Deep Tendon Reflexes: Reflexes are normal and symmetric.   Psychiatric:         Mood and Affect: Mood normal.         Behavior: Behavior normal.         Thought Content: Thought content normal.         Judgment: Judgment normal.        Procedures           ED Course                                           Medical Decision Making  Patient is a 53-year-old male who presents to the ER with chief complaints of right shoulder pain.  He states he was lifting a washer and dryer 3 days ago and since this time he has had pain to the right shoulder, indicates having pain to the shoulder and issues with the shoulder in the past.  Past medical history significant for meniscus tear of the right knee, gallbladder dysfunction, GERD, intestinal ulcer.  Differential diagnosis: Shoulder strain, ligamentous injury, shoulder fracture, and other    XR Ribs Right With PA Chest   Final Result    1. Negative right rib series.    2. No evidence of lung contusion or pneumothorax. No active disease.         This report was finalized on 08/13/2023 19:23 by Dr. Jonh Guzmán MD.     XR Shoulder 2+ View Right   Final Result    1. No evidence of acute fracture.    2. Mild spurring of the glenohumeral joint on the right.         This report was finalized on 08/13/2023 19:46 by Dr. Jonh Guzmán MD.      No acute fracture noted on x-ray, we will provide patient with orthopedist information and have him follow-up for further evaluation as he may have an internal derangement injury to the right shoulder.  He will be discharged home shortly in stable condition.    Problems Addressed:  Shoulder strain, right, initial encounter: complicated acute illness or injury    Amount and/or Complexity of Data Reviewed  Radiology: ordered.    Risk  Prescription drug management.        Final diagnoses:   Shoulder strain, right, initial encounter       ED Disposition  ED Disposition       ED Disposition   Discharge    Condition   Good    Comment   --               Gianluca Yousif MD  [address] [place] [id number]  [phone]               Medication List        New Prescriptions      cyclobenzaprine 10 MG tablet  Commonly known as: FLEXERIL  Take 1 tablet by mouth 3 (Three)  Times a Day As Needed for Muscle Spasms.            Changed      naproxen 500 MG tablet  Commonly known as: NAPROSYN  Take 1 tablet by mouth 2 (Two) Times a Day As Needed for Moderate Pain.  What changed: reasons to take this     oxyCODONE-acetaminophen 5-325 MG per tablet  Commonly known as: PERCOCET  Take 1 tablet by mouth Every 4 (Four) Hours As Needed for Moderate Pain.  What changed: when to take this               Where to Get Your Medications        These medications were sent to [facility]/pharmacy #[id number] - [place], [place] - [address] - [phone]  - [phone]   [address] [facility] [place] [address]      Phone: [phone]   cyclobenzaprine 10 MG tablet  naproxen 500 MG tablet  oxyCODONE-acetaminophen 5-325 MG per tablet            Tanisha Tai, APRN  08/25/23 1047

## 2024-09-16 ENCOUNTER — HOSPITAL ENCOUNTER (EMERGENCY)
Age: 54
Discharge: HOME | End: 2024-09-16
Payer: MEDICAID

## 2024-09-16 ENCOUNTER — APPOINTMENT (OUTPATIENT)
Dept: GENERAL RADIOLOGY | Age: 54
End: 2024-09-16
Payer: MEDICAID

## 2024-09-16 ENCOUNTER — APPOINTMENT (OUTPATIENT)
Dept: CT IMAGING | Age: 54
End: 2024-09-16
Payer: MEDICAID

## 2024-09-16 VITALS
HEART RATE: 78 BPM | HEIGHT: 71 IN | RESPIRATION RATE: 18 BRPM | WEIGHT: 150 LBS | TEMPERATURE: 98.2 F | DIASTOLIC BLOOD PRESSURE: 86 MMHG | OXYGEN SATURATION: 98 % | BODY MASS INDEX: 21 KG/M2 | SYSTOLIC BLOOD PRESSURE: 139 MMHG

## 2024-09-16 DIAGNOSIS — R59.0 INGUINAL LYMPHADENOPATHY: ICD-10-CM

## 2024-09-16 DIAGNOSIS — R10.84 GENERALIZED ABDOMINAL PAIN: Primary | ICD-10-CM

## 2024-09-16 LAB
ALBUMIN SERPL-MCNC: 4.2 G/DL (ref 3.5–5.2)
ALP SERPL-CCNC: 62 U/L (ref 40–129)
ALT SERPL-CCNC: 10 U/L (ref 5–41)
ANION GAP SERPL CALCULATED.3IONS-SCNC: 11 MMOL/L (ref 7–19)
AST SERPL-CCNC: 16 U/L (ref 5–40)
BACTERIA URNS QL MICRO: NEGATIVE /HPF
BASOPHILS # BLD: 0 K/UL (ref 0–0.2)
BASOPHILS NFR BLD: 0.4 % (ref 0–1)
BILIRUB SERPL-MCNC: 0.3 MG/DL (ref 0.2–1.2)
BILIRUB UR QL STRIP: NEGATIVE
BUN SERPL-MCNC: 12 MG/DL (ref 6–20)
CALCIUM SERPL-MCNC: 9.2 MG/DL (ref 8.6–10)
CHLORIDE SERPL-SCNC: 100 MMOL/L (ref 98–111)
CLARITY UR: ABNORMAL
CO2 SERPL-SCNC: 25 MMOL/L (ref 22–29)
COLOR UR: YELLOW
CREAT SERPL-MCNC: 0.6 MG/DL (ref 0.7–1.2)
CRYSTALS URNS MICRO: NORMAL /HPF
EOSINOPHIL # BLD: 0.3 K/UL (ref 0–0.6)
EOSINOPHIL NFR BLD: 2.7 % (ref 0–5)
EPI CELLS #/AREA URNS AUTO: 0 /HPF (ref 0–5)
ERYTHROCYTE [DISTWIDTH] IN BLOOD BY AUTOMATED COUNT: 13.2 % (ref 11.5–14.5)
GLUCOSE SERPL-MCNC: 93 MG/DL (ref 70–99)
GLUCOSE UR STRIP.AUTO-MCNC: NEGATIVE MG/DL
HCT VFR BLD AUTO: 40.8 % (ref 42–52)
HGB BLD-MCNC: 13.5 G/DL (ref 14–18)
HGB UR STRIP.AUTO-MCNC: NEGATIVE MG/L
HYALINE CASTS #/AREA URNS AUTO: 0 /HPF (ref 0–8)
IMM GRANULOCYTES # BLD: 0 K/UL
KETONES UR STRIP.AUTO-MCNC: NEGATIVE MG/DL
LEUKOCYTE ESTERASE UR QL STRIP.AUTO: ABNORMAL
LIPASE SERPL-CCNC: 31 U/L (ref 13–60)
LYMPHOCYTES # BLD: 3.5 K/UL (ref 1.1–4.5)
LYMPHOCYTES NFR BLD: 31.6 % (ref 20–40)
MCH RBC QN AUTO: 30.1 PG (ref 27–31)
MCHC RBC AUTO-ENTMCNC: 33.1 G/DL (ref 33–37)
MCV RBC AUTO: 90.9 FL (ref 80–94)
MONOCYTES # BLD: 0.9 K/UL (ref 0–0.9)
MONOCYTES NFR BLD: 8.2 % (ref 0–10)
NEUTROPHILS # BLD: 6.3 K/UL (ref 1.5–7.5)
NEUTS SEG NFR BLD: 56.7 % (ref 50–65)
NITRITE UR QL STRIP.AUTO: NEGATIVE
PH UR STRIP.AUTO: 7 [PH] (ref 5–8)
PLATELET # BLD AUTO: 296 K/UL (ref 130–400)
PMV BLD AUTO: 8.9 FL (ref 9.4–12.4)
POTASSIUM SERPL-SCNC: 4.1 MMOL/L (ref 3.5–5)
PROT SERPL-MCNC: 7 G/DL (ref 6.4–8.3)
PROT UR STRIP.AUTO-MCNC: NEGATIVE MG/DL
RBC # BLD AUTO: 4.49 M/UL (ref 4.7–6.1)
RBC #/AREA URNS AUTO: 1 /HPF (ref 0–4)
SODIUM SERPL-SCNC: 136 MMOL/L (ref 136–145)
SP GR UR STRIP.AUTO: 1.03 (ref 1–1.03)
UROBILINOGEN UR STRIP.AUTO-MCNC: 0.2 E.U./DL
WBC # BLD AUTO: 11.1 K/UL (ref 4.8–10.8)
WBC #/AREA URNS AUTO: 1 /HPF (ref 0–5)

## 2024-09-16 PROCEDURE — 99285 EMERGENCY DEPT VISIT HI MDM: CPT

## 2024-09-16 PROCEDURE — 74177 CT ABD & PELVIS W/CONTRAST: CPT

## 2024-09-16 PROCEDURE — 85025 COMPLETE CBC W/AUTO DIFF WBC: CPT

## 2024-09-16 PROCEDURE — 6360000004 HC RX CONTRAST MEDICATION: Performed by: NURSE PRACTITIONER

## 2024-09-16 PROCEDURE — 71046 X-RAY EXAM CHEST 2 VIEWS: CPT

## 2024-09-16 PROCEDURE — 36415 COLL VENOUS BLD VENIPUNCTURE: CPT

## 2024-09-16 PROCEDURE — 83690 ASSAY OF LIPASE: CPT

## 2024-09-16 PROCEDURE — 81001 URINALYSIS AUTO W/SCOPE: CPT

## 2024-09-16 PROCEDURE — 80053 COMPREHEN METABOLIC PANEL: CPT

## 2024-09-16 RX ORDER — IOPAMIDOL 755 MG/ML
70 INJECTION, SOLUTION INTRAVASCULAR
Status: COMPLETED | OUTPATIENT
Start: 2024-09-16 | End: 2024-09-16

## 2024-09-16 RX ADMIN — IOPAMIDOL 70 ML: 755 INJECTION, SOLUTION INTRAVENOUS at 19:57

## 2024-09-16 ASSESSMENT — ENCOUNTER SYMPTOMS
ANAL BLEEDING: 0
NAUSEA: 1
ABDOMINAL PAIN: 1
CONSTIPATION: 0
DIARRHEA: 0
RESPIRATORY NEGATIVE: 1
BLOOD IN STOOL: 0

## 2024-09-16 ASSESSMENT — PAIN - FUNCTIONAL ASSESSMENT: PAIN_FUNCTIONAL_ASSESSMENT: 0-10

## 2024-09-16 ASSESSMENT — PAIN SCALES - GENERAL: PAINLEVEL_OUTOF10: 8

## 2024-12-02 ENCOUNTER — APPOINTMENT (OUTPATIENT)
Dept: GENERAL RADIOLOGY | Facility: HOSPITAL | Age: 54
End: 2024-12-02
Payer: COMMERCIAL

## 2024-12-02 ENCOUNTER — HOSPITAL ENCOUNTER (EMERGENCY)
Facility: HOSPITAL | Age: 54
Discharge: HOME | End: 2024-12-02
Admitting: STUDENT IN AN ORGANIZED HEALTH CARE EDUCATION/TRAINING PROGRAM
Payer: COMMERCIAL

## 2024-12-02 VITALS
HEIGHT: 71 IN | TEMPERATURE: 98.4 F | DIASTOLIC BLOOD PRESSURE: 78 MMHG | OXYGEN SATURATION: 96 % | WEIGHT: 155.8 LBS | RESPIRATION RATE: 18 BRPM | HEART RATE: 70 BPM | BODY MASS INDEX: 21.81 KG/M2 | SYSTOLIC BLOOD PRESSURE: 125 MMHG

## 2024-12-02 DIAGNOSIS — M25.531 ACUTE PAIN OF RIGHT WRIST: Primary | ICD-10-CM

## 2024-12-02 PROCEDURE — 73110 X-RAY EXAM OF WRIST: CPT

## 2024-12-02 PROCEDURE — 99283 EMERGENCY DEPT VISIT LOW MDM: CPT

## 2024-12-02 PROCEDURE — 96372 THER/PROPH/DIAG INJ SC/IM: CPT

## 2024-12-02 PROCEDURE — 25010000002 KETOROLAC TROMETHAMINE PER 15 MG: Performed by: NURSE PRACTITIONER

## 2024-12-02 RX ORDER — KETOROLAC TROMETHAMINE 30 MG/ML
30 INJECTION, SOLUTION INTRAMUSCULAR; INTRAVENOUS ONCE
Status: COMPLETED | OUTPATIENT
Start: 2024-12-02 | End: 2024-12-02

## 2024-12-02 RX ORDER — NAPROXEN 500 MG/1
500 TABLET ORAL EVERY 12 HOURS PRN
Qty: 15 TABLET | Refills: 0 | Status: SHIPPED | OUTPATIENT
Start: 2024-12-02

## 2024-12-02 RX ORDER — HYDROCODONE BITARTRATE AND ACETAMINOPHEN 5; 325 MG/1; MG/1
1 TABLET ORAL EVERY 6 HOURS PRN
Qty: 4 TABLET | Refills: 0 | Status: SHIPPED | OUTPATIENT
Start: 2024-12-02

## 2024-12-02 RX ADMIN — KETOROLAC TROMETHAMINE 30 MG: 30 INJECTION, SOLUTION INTRAMUSCULAR; INTRAVENOUS at 01:52

## 2024-12-02 NOTE — Clinical Note
[facility] EMERGENCY DEPARTMENT  [address]  [facility] [address]  Phone: [phone]    Balbir Hdez was seen and treated in our emergency department on 12/2/2024.  He may return to work on 12/04/2024.         Thank you for choosing [facility].    Radha Bennett APRN

## 2024-12-02 NOTE — DISCHARGE INSTRUCTIONS
It was very nice to meet you. Thank you for allowing us to take care of you today at [facility].     Diagnosis: Wrist pain with possible carpal tunnel syndrome. X-ray is abnormal, but no acute fracture is identified. The final radiology review is pending.      The recommended treatment includes RICE therapy (Rest, Ice, Compression, Elevation). This is a standard approach for managing sprains, strains, and other abnormalities to reduce pain, swelling, and promote healing.    Rest:  Limit Activity: Avoid activities that worsen the pain, such as repetitive motions or heavy lifting.  Limit wrist movement is much as possible.    Ice:  Apply Ice: Use an ice pack, a bag of frozen peas, or a cold compress wrapped in a thin towel to the injured area.  Timing: Apply ice for 15-20 minutes every 2-3 hours during the first 48 hours after the injury.  Do Not Apply Ice Directly to the Skin: Always wrap the ice pack in a cloth or towel to avoid frostbite.    Compression:  Splinting: Wear the wrist splint provided to support the joint and minimize movement. Ensure the splint stays clean and dry.   Check for Circulation: Make sure the splint is not too tight. Loosen it if you experience numbness, tingling, increased pain, or if your fingers or toes turn blue or cold.    Elevation:  Keep the Injured Area Elevated: Elevate the injured limb above the level of the heart as much as possible to reduce swelling.  Support with Pillows: Use pillows to support the injured area while you are resting, especially during sleep.    Pain Management:  Take the Naproxen as needed for pain, unless advised not to do so by another clinician. Do not exceed the dose prescribed. This medication is classified as a non-steroidal anti-inflammatory drug (NSAID). Do not take any other prescribed or over-the-counter NSAIDs while taking this prescribed medication to prevent duplicating the drug class and causing adverse drug reactions. Common NSAIDs  include Ibuprofen (Motrin, Advil), Naproxen (Aleve), Meloxicam (Mobic), Diclofenac (Voltaren), Celecoxib (Celebrex), Indomethacin (Indocin), Nabumetone (Relafen) and Etodolac (Lodine), among others.   Okay to also take Acetaminophen (Tylenol) in addition to the NSAID prescribed, again, unless otherwise advised not to do so. Follow the dosing instructions according to the medication label. To prevent liver dysfunction and toxicity, the maximum dose of acetaminophen for most people is 4000 milligrams per 24-hours. Do not exceed this limit and include milligrams of Acetaminophen from all sources that contain this drug in your daily calculations. (For example, some over-the-counter and prescription combination drugs contain Acetaminophen.)    Take the hydrocodone-acetaminophen tablets as needed for severe pain. Do not drive or operate machinery while taking this medication because it may cause drowsiness and a reduced response time.   If you experience any unusual signs and symptoms associated with use, stop taking these medications immediately and follow-up accordingly.     Activity Restrictions:  Avoid Strenuous Activities: Do not engage in activities that put stress on the wrist until it has healed or until your healthcare provider gives the go-ahead.    Follow-Up Care:  Schedule a Follow-Up Appointment: Schedule a follow-up appointment as recommended to assess your progress and determine if further treatment is necessary.    When to Seek Immediate Care:  Return to the ER or contact your healthcare provider if your child experiences any of the following:  Increased Pain: Pain that worsens or does not improve with rest and medication.  Swelling that Doesn’t Improve: Persistent or worsening swelling despite following RICE therapy.  Numbness or Tingling: Numbness, tingling, or loss of sensation in the injured area.  Changes in Skin Color or Temperature: If the skin turns blue, pale, or feels cold around the  injury.  Inability to Move the Injured Area: If you are unable to move the injured area or bear weight after resting.  Any new or worsening symptoms.     Additional Instructions:  Keep the Area Clean and Dry:     Today you were seen in the emergency department for your symptoms. Please understand that an ER evaluation is just the start of your evaluation. We do the best we can, but we are often unable to fully find what is causing your symptoms from this single evaluation.  Because of this, the goal is to determine whether you need to be admitted to the hospital or if it is safe for you to go home and follow-up with your other health care providers such as your primary care provider physicians or a specialist on an outpatient basis.      Like we discussed, I strongly urge that you follow up with your primary care provider. Please call their office to set up an appointment as soon as possible so that you can be re-evaluated for your symptoms or for any other questions.  I have provided the information needed, including phone number, to call to set up an appointment in these discharge papers.      Follow up with one of the [facility] physician groups below to setup primary care. If you have trouble making an appointment, please call the [facility] Nurse Line at [phone]    [facility] Primary Care - [address]  [address] [phone]    [facility] Internal Medicine - [address] [address], [address], [place] [address] [phone]    [facility] - [address] [address], [address], [place] [address] [phone]     [facility] ([facility]) - [place]  [address] [address], [address], [place] [address] [phone]    [facility] - [address],  [address] [phone]    [facility] - [address], [place] [address] [phone]    [facility]  [address], [place], [address] [phone]    [facility]  [address] [place] [address], [address] [phone]    [facility] - [address] [address] [phone]    [facility]  [address] [place] [address] [address], [place], [place], [address] [phone]    Educational material has also been provided in the following pages. Please take the time to read through this information for important and helpful information.      Please return to the emergency room within 12-48 hours if you experience symptoms such as the following:   Fever, chills, chest pain or shortness of breath, pain with inspiration/expiration, pain that travels to your arms, neck or back, nausea, vomiting, severe headache, tearing pain in your chest, dizziness, feel as though you are about to pass out, OR if you have any worsening symptoms, or any other concerns.

## 2024-12-02 NOTE — Clinical Note
[facility] EMERGENCY DEPARTMENT  [address]  [facility] [address]  Phone: [phone]    Balbir Hdez was seen and treated in our emergency department on 12/2/2024.  He may return to work on 12/04/2024.         Thank you for choosing [facility].    Radha Bennett APRN

## 2024-12-02 NOTE — ED PROVIDER NOTES
"Subjective   History of Present Illness  54-year-old male presents to the ED with a chief complaint of right wrist pain. He reports a history of chronic, intermittent, bilateral wrist pain due to a self diagnosis of \"carpal tunnel\". The pain in the right wrist is typically greater than the left. He awoke yesterday morning with bilateral wrist pain, which he experiences \"all of the time\". He adds the \"pain goes away\" after \"about an hour and a half or so\". The left wrist is back to baseline. The right wrist pain has continued. Described as a \"nerve pain\" and radiating into the left hand. He denies any acute injury, insect bite, or break in the skin integrity. He \"cuts trees for a living\", but \"in the winter, I don't work\".       Review of Systems   Constitutional:  Negative for chills and fever.   Respiratory:  Negative for shortness of breath.    Musculoskeletal:  Positive for arthralgias and joint swelling.   Skin:  Negative for wound.       Past Medical History:   Diagnosis Date    Acute medial meniscus tear of right knee     Gallbladder attack     GERD (gastroesophageal reflux disease)     Ulcer of intestine        No Known Allergies    Past Surgical History:   Procedure Laterality Date    HAND SURGERY Left     HAND SURGERY Right     ORIF    KNEE ARTHROSCOPY Right 7/1/2020    Procedure: KNEE ARTHROSCOPY;  Surgeon: Bruce Dorado MD;  Location: [facility];  Service: Orthopedics;  Laterality: Right;    SHOULDER SURGERY Right        History reviewed. No pertinent family history.    Social History     Socioeconomic History    Marital status: Single   Tobacco Use    Smoking status: Every Day     Current packs/day: 1.00     Types: Cigarettes    Smokeless tobacco: Never   Substance and Sexual Activity    Alcohol use: No    Drug use: Yes     Types: Marijuana     Comment: DAILY    Sexual activity: Defer           Objective   Physical Exam  Vitals and nursing note reviewed.   Constitutional:       General: He is awake. " He is in acute distress (grimacing and guarding the right wrist with reports of pain).      Appearance: He is not ill-appearing, toxic-appearing or diaphoretic.   Cardiovascular:      Rate and Rhythm: Normal rate and regular rhythm.      Pulses: Normal pulses.      Heart sounds: Normal heart sounds. No murmur heard.  Pulmonary:      Effort: Pulmonary effort is normal. No respiratory distress.      Breath sounds: Normal breath sounds. No stridor.   Musculoskeletal:      Right shoulder: Normal.      Right upper arm: Normal.      Right elbow: Normal.      Right forearm: Normal. No swelling or tenderness.      Right wrist: Swelling (mild swelling noted when compared with the left) and tenderness present. No deformity, snuff box tenderness or crepitus. Decreased range of motion (r/t pain). Normal pulse.      Left wrist: Normal. No swelling or tenderness. Normal range of motion. Normal pulse.      Right hand: Tenderness (Tenderness with palpation of the palm and first three fingers.) present. No swelling, deformity or bony tenderness. Normal strength. Normal sensation. There is no disruption of two-point discrimination. Normal capillary refill. Normal pulse.      Cervical back: Normal. No pain with movement.   Skin:     General: Skin is warm and dry.      Capillary Refill: Capillary refill takes less than 2 seconds.   Neurological:      Mental Status: He is alert and oriented to person, place, and time.   Psychiatric:         Mood and Affect: Mood normal.         Behavior: Behavior normal. Behavior is cooperative.         Procedures           ED Course  ED Course as of 12/02/24 1758   Sun Dec 01, 2024   0135 Patient declined x-ray imaging of hand and forearm. Velcro wrist brace ordered along with Toradol IM.  [TD]   0300 XR Wrist 3+ View Right  Independent interpretation. Abnormal x-ray. Spacing is increased on lateral side of wrist. No fracture noted.  [TD]   Mon Dec 02, 2024   0319 ED attending (Zak) consulted.  Plan: Okay to discharge.  [TD]      ED Course User Index  [TD] Radha Bennett APRN                                                       Medical Decision Making  Course of [place] in the ED:  Labs Reviewed - No data to display    Medications  ketorolac (TORADOL) injection 30 mg (30 mg Intramuscular Given 12/2/24 0152)    XR Wrist 3+ View Right (Pending)         Problems Addressed:  Acute pain of right wrist: complicated acute illness or injury    Amount and/or Complexity of Data Reviewed  Radiology: ordered and independent interpretation performed. Decision-making details documented in ED Course.    Risk  Prescription drug management.        Final diagnoses:   Acute pain of right wrist       ED Disposition  ED Disposition       ED Disposition   Discharge    Condition   Stable    Comment   --               Provider, No Known  [place] [address]  [phone]      Follow-up with your primary care provider, if you do not have a primary care provider, see the telephone numbers provided to call and establish care    [facility]  [address] [place] [address] [phone]  Schedule an appointment as soon as possible for a visit       [facility] EMERGENCY DEPARTMENT  [address] [place] [id number] [phone]    As needed, If not improving and sooner if worsening    A medication reconciliation was completed based on the patient's report of medications that have either been discontinued or completed.  Medication List        New Prescriptions      naproxen 500 MG tablet #15  Commonly known as: NAPROSYN  Take 1 tablet by mouth Every 12 (Twelve) Hours As Needed (pain).    HYDROcodone-acetaminophen 5-325 MG per tablet #4  Commonly known as: NORCO  Take 1 tablet by mouth Every 6 (Six) Hours As Needed for Severe Pain.                 Stop      cyclobenzaprine 10 MG tablet  Commonly known as: FLEXERIL     oxyCODONE-acetaminophen  5-325 MG per tablet  Commonly known as: PERCOCET               Where to Get Your Medications        These medications were sent to [id number] - [place], [place] - [address] - [phone]  - [phone]   [address] [place] [address]      Phone: [phone]   HYDROcodone-acetaminophen 5-325 MG per tablet  naproxen 500 MG tablet           Radha Bennett, APRN  12/02/24 2852

## (undated) DEVICE — DISPOSABLE TOURNIQUET CUFF SINGLE BLADDER, SINGLE PORT AND QUICK CONNECT CONNECTOR: Brand: COLOR CUFF

## (undated) DEVICE — 4-PORT MANIFOLD: Brand: NEPTUNE 2

## (undated) DEVICE — DRSNG WND GZ CURAD OIL EMULSION 3X3IN STRL

## (undated) DEVICE — PK TURNOVER RM ADV

## (undated) DEVICE — PAD UNDERCAST WYTEX 6IN 4YD LF STRL

## (undated) DEVICE — CVR BRD ARM 13X30

## (undated) DEVICE — [TOMCAT CUTTER, ARTHROSCOPIC SHAVER BLADE,  DO NOT RESTERILIZE,  DO NOT USE IF PACKAGE IS DAMAGED,  KEEP DRY,  KEEP AWAY FROM SUNLIGHT]: Brand: FORMULA

## (undated) DEVICE — TBG ARTHRO FLOWSTEADY/ST DISP

## (undated) DEVICE — SUT ETHLN 4/0 FS2 18IN 662H

## (undated) DEVICE — TRY PREP SCRB VAG PVP

## (undated) DEVICE — GLV SURG TRIUMPH PF LTX 7.5 STRL

## (undated) DEVICE — PK KN ARTHSCP 30

## (undated) DEVICE — GLV SURG DERMASSURE GRN LF PF 8.0

## (undated) DEVICE — SUCTION MAT (LOW PROFILE), 50X34: Brand: NEPTUNE